# Patient Record
Sex: FEMALE | Race: BLACK OR AFRICAN AMERICAN | Employment: PART TIME | URBAN - METROPOLITAN AREA
[De-identification: names, ages, dates, MRNs, and addresses within clinical notes are randomized per-mention and may not be internally consistent; named-entity substitution may affect disease eponyms.]

---
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
## 2018-02-17 ENCOUNTER — HOSPITAL ENCOUNTER (EMERGENCY)
Age: 18
Discharge: HOME | End: 2018-02-17
Attending: EMERGENCY MEDICINE
Payer: SELF-PAY

## 2018-02-17 ENCOUNTER — APPOINTMENT (OUTPATIENT)
Dept: GENERAL RADIOLOGY | Age: 18
End: 2018-02-17
Attending: PHYSICIAN ASSISTANT
Payer: SELF-PAY

## 2018-02-17 VITALS
OXYGEN SATURATION: 99 % | DIASTOLIC BLOOD PRESSURE: 72 MMHG | TEMPERATURE: 98.4 F | HEART RATE: 103 BPM | RESPIRATION RATE: 16 BRPM | SYSTOLIC BLOOD PRESSURE: 125 MMHG

## 2018-02-17 DIAGNOSIS — M79.672 PAIN IN BOTH FEET: Primary | ICD-10-CM

## 2018-02-17 DIAGNOSIS — M79.671 PAIN IN BOTH FEET: Primary | ICD-10-CM

## 2018-02-17 DIAGNOSIS — S16.1XXA NECK STRAIN, INITIAL ENCOUNTER: ICD-10-CM

## 2018-02-17 LAB — HCG UR QL: NEGATIVE

## 2018-02-17 PROCEDURE — 81025 URINE PREGNANCY TEST: CPT | Performed by: EMERGENCY MEDICINE

## 2018-02-17 PROCEDURE — 74011250636 HC RX REV CODE- 250/636: Performed by: PHYSICIAN ASSISTANT

## 2018-02-17 PROCEDURE — 96372 THER/PROPH/DIAG INJ SC/IM: CPT

## 2018-02-17 PROCEDURE — 73620 X-RAY EXAM OF FOOT: CPT

## 2018-02-17 PROCEDURE — 99283 EMERGENCY DEPT VISIT LOW MDM: CPT

## 2018-02-17 RX ORDER — METHOCARBAMOL 500 MG/1
500 TABLET, FILM COATED ORAL 4 TIMES DAILY
Qty: 20 TAB | Refills: 0 | Status: SHIPPED | OUTPATIENT
Start: 2018-02-17 | End: 2018-02-22

## 2018-02-17 RX ORDER — NAPROXEN 500 MG/1
500 TABLET ORAL 2 TIMES DAILY WITH MEALS
Qty: 20 TAB | Refills: 0 | Status: SHIPPED | OUTPATIENT
Start: 2018-02-17 | End: 2018-02-27

## 2018-02-17 RX ORDER — KETOROLAC TROMETHAMINE 30 MG/ML
60 INJECTION, SOLUTION INTRAMUSCULAR; INTRAVENOUS
Status: COMPLETED | OUTPATIENT
Start: 2018-02-17 | End: 2018-02-17

## 2018-02-17 RX ADMIN — KETOROLAC TROMETHAMINE 60 MG: 30 INJECTION, SOLUTION INTRAMUSCULAR at 14:45

## 2018-02-17 NOTE — DISCHARGE INSTRUCTIONS
Foot Pain: Care Instructions  Your Care Instructions  Foot injuries that cause pain and swelling are fairly common. Almost all sports or home repair projects can cause a misstep that ends up as foot pain. Normal wear and tear, especially as you get older, also can cause foot pain. Most minor foot injuries will heal on their own, and home treatment is usually all you need to do. If you have a severe injury, you may need tests and treatment. Follow-up care is a key part of your treatment and safety. Be sure to make and go to all appointments, and call your doctor if you are having problems. It's also a good idea to know your test results and keep a list of the medicines you take. How can you care for yourself at home? · Take pain medicines exactly as directed. ¨ If the doctor gave you a prescription medicine for pain, take it as prescribed. ¨ If you are not taking a prescription pain medicine, ask your doctor if you can take an over-the-counter medicine. · Rest and protect your foot. Take a break from any activity that may cause pain. · Put ice or a cold pack on your foot for 10 to 20 minutes at a time. Put a thin cloth between the ice and your skin. · Prop up the sore foot on a pillow when you ice it or anytime you sit or lie down during the next 3 days. Try to keep it above the level of your heart. This will help reduce swelling. · Your doctor may recommend that you wrap your foot with an elastic bandage. Keep your foot wrapped for as long as your doctor advises. · If your doctor recommends crutches, use them as directed. · Wear roomy footwear. · As soon as pain and swelling end, begin gentle exercises of your foot. Your doctor can tell you which exercises will help. When should you call for help? Call 911 anytime you think you may need emergency care. For example, call if:  ? · Your foot turns pale, white, blue, or cold.    ?Call your doctor now or seek immediate medical care if:  ? · You cannot move or stand on your foot. ? · Your foot looks twisted or out of its normal position. ? · Your foot is not stable when you step down. ? · You have signs of infection, such as:  ¨ Increased pain, swelling, warmth, or redness. ¨ Red streaks leading from the sore area. ¨ Pus draining from a place on your foot. ¨ A fever. ? · Your foot is numb or tingly. ? Watch closely for changes in your health, and be sure to contact your doctor if:  ? · You do not get better as expected. ? · You have bruises from an injury that last longer than 2 weeks. Where can you learn more? Go to http://georgina-tavo.info/. Enter H991 in the search box to learn more about \"Foot Pain: Care Instructions. \"  Current as of: March 21, 2017  Content Version: 11.4  © 9114-3739 [employer]. Care instructions adapted under license by [employer] (which disclaims liability or warranty for this information). If you have questions about a medical condition or this instruction, always ask your healthcare professional. [address] any warranty or liability for your use of this information. Neck Strain: Care Instructions  Your Care Instructions    You have strained the muscles and ligaments in your neck. A sudden, awkward movement can strain the neck. This often occurs with falls or car accidents or during certain sports. Everyday activities like working on a computer or sleeping can also cause neck strain if they force you to hold your neck in an awkward position for a long time. It is common for neck pain to get worse for a day or two after an injury, but it should start to feel better after that. You may have more pain and stiffness for several days before it gets better. This is expected. It may take a few weeks or longer for it to heal completely. Good home treatment can help you get better faster and avoid future neck problems.   Follow-up care is a key part of your treatment and safety. Be sure to make and go to all appointments, and call your doctor if you are having problems. It's also a good idea to know your test results and keep a list of the medicines you take. How can you care for yourself at home? · If you were given a neck brace (cervical collar) to limit neck motion, wear it as instructed for as many days as your doctor tells you to. Do not wear it longer than you were told to. Wearing a brace for too long can make neck stiffness worse and weaken the neck muscles. · You can try using heat or ice to see if it helps. ¨ Try using a heating pad on a low or medium setting for 15 to 20 minutes every 2 to 3 hours. Try a warm shower in place of one session with the heating pad. You can also buy single-use heat wraps that last up to 8 hours. ¨ You can also try an ice pack for 10 to 15 minutes every 2 to 3 hours. · Take pain medicines exactly as directed. ¨ If the doctor gave you a prescription medicine for pain, take it as prescribed. ¨ If you are not taking a prescription pain medicine, ask your doctor if you can take an over-the-counter medicine. · Gently rub the area to relieve pain and help with blood flow. Do not massage the area if it hurts to do so. · Do not do anything that makes the pain worse. Take it easy for a couple of days. You can do your usual activities if they do not hurt your neck or put it at risk for more stress or injury. · Try sleeping on a special neck pillow. Place it under your neck, not under your head. Placing a tightly rolled-up towel under your neck while you sleep will also work. If you use a neck pillow or rolled towel, do not use your regular pillow at the same time. · To prevent future neck pain, do exercises to stretch and strengthen your neck and back. Learn how to use good posture, safe lifting techniques, and proper body mechanics. When should you call for help? Call 911 anytime you think you may need emergency care.  For example, call if:  ? · You are unable to move an arm or a leg at all. ?Call your doctor now or seek immediate medical care if:  ? · You have new or worse symptoms in your arms, legs, chest, belly, or buttocks. Symptoms may include:  ¨ Numbness or tingling. ¨ Weakness. ¨ Pain. ? · You lose bladder or bowel control. ? Watch closely for changes in your health, and be sure to contact your doctor if:  ? · You are not getting better as expected. Where can you learn more? Go to http://georgina-tavo.info/. Enter M253 in the search box to learn more about \"Neck Strain: Care Instructions. \"  Current as of: March 21, 2017  Content Version: 11.4  © 8113-0706 [employer]. Care instructions adapted under license by [employer] (which disclaims liability or warranty for this information). If you have questions about a medical condition or this instruction, always ask your healthcare professional. [place] [address] any warranty or liability for your use of this information.

## 2018-02-17 NOTE — ED PROVIDER NOTES
EMERGENCY DEPARTMENT HISTORY AND PHYSICAL EXAM    1:52 PM      Date: 2/17/2018  Patient Name: Bethanie Blank    History of Presenting Illness     Chief Complaint   Patient presents with    Neck Pain    Foot Pain         History Provided By: Patient    Chief Complaint: neck pain, foot pain  Duration: 2 Months  Timing:  Intermittent  Location: left side of neck, both feet  Quality: Cramping, Sharp and Tightness  Severity: 10 out of 10  Modifying Factors: neck pain worse in the morning, foot pain worse after long standing  Associated Symptoms: denies any other associated signs or symptoms      Additional History (Context): Bethanie Blank is a 21 y.o. female with No significant past medical history who presents with 2 months of intermittent left sided neck pain (cramping/tightness) that is worse in the morning and 10/10 sharp bilateral foot pain that is worse after long standing. Pt works in a job that requires her to stand for 8 hours. No other concerns or symptoms at this time. PCP: Kasia Copeland MD        Past History     Past Medical History:  No past medical history on file. Past Surgical History:  No past surgical history on file. Family History:  No family history on file. Social History:  Social History   Substance Use Topics    Smoking status: Not on file    Smokeless tobacco: Not on file    Alcohol use Not on file       Allergies:  No Known Allergies      Review of Systems       Review of Systems   Gastrointestinal: Negative for abdominal pain. Musculoskeletal: Positive for neck pain (left side). Positive for bilateral foot pain   All other systems reviewed and are negative. Physical Exam     Visit Vitals    /72 (BP 1 Location: Left arm, BP Patient Position: At rest)    Pulse (!) 103    Temp 98.4 °F (36.9 °C)    Resp 16    SpO2 99%         Physical Exam   Constitutional: She is oriented to person, place, and time.  She appears well-developed and well-nourished. No distress. HENT:   Head: Normocephalic and atraumatic. Eyes: EOM are normal. Pupils are equal, round, and reactive to light. Neck: Neck supple. No tracheal deviation present. Cardiovascular: Normal rate, regular rhythm and normal heart sounds. Exam reveals no gallop and no friction rub. No murmur heard. Pulmonary/Chest: Effort normal and breath sounds normal. No respiratory distress. She has no wheezes. She has no rales. Musculoskeletal:   Non tender to midline palpation of the cervical, thoracic, and lumbar spine. Mild TTP over the superior border of the left trapezius with noted muscle spasm. No step off or deformity. FROM of BUE and BLE against resistance in flexion and extension with 5/5 strength. Non tender to bilateral shoulders/elbows/hands/hips/knees/ankles. Pulses intact and equal.   Mild TTP over the base of the feet, noted flat footed feet. No edema or erythema. Ambulates with ease. Sensation is grossly intact. Lymphadenopathy:     She has no cervical adenopathy. Neurological: She is alert and oriented to person, place, and time. No cranial nerve deficit. Skin: Skin is warm and dry. No rash noted. She is not diaphoretic. No erythema. No pallor. Psychiatric: She has a normal mood and affect. Her behavior is normal.   Nursing note and vitals reviewed. Diagnostic Study Results     Labs -  No results found for this or any previous visit (from the past 12 hour(s)). Radiologic Studies -   XR FOOT LT AP/LAT    (Results Pending)   XR FOOT RT AP/LAT    (Results Pending)   XR foot bilateral prelim by me: No acute abnormality      Medical Decision Making   I am the first provider for this patient. I reviewed the vital signs, available nursing notes, past medical history, past surgical history, family history and social history. Vital Signs-Reviewed the patient's vital signs.     Records Reviewed: Nursing Notes and Old Medical Records (Time of Review: 1:52 PM)    ED Course: Progress Notes, Reevaluation, and Consults:  DDX:  Foot strain, neck strain, plantar fasciitis    Impression: Patient with bilateral foot pain, likely from overuse at her job, will give compression hose, plus encouraged patient to get foot inserts. As for neck, suspect musculoskeletal origin, do not think she needs an XR today on her neck. Will send hoem with muscle relaxant and Nsaids. Patient agrees. [place], [place]          Diagnosis     Clinical Impression:   1. Pain in both feet    2. Neck strain, initial encounter        Disposition: discharged    Follow-up Information     Follow up With Details Comments Contact Info    Binu Stevens DPM Go in 2 days For follow up [address] [place] [facility] EMERGENCY DEPT Go to As needed, If symptoms worsen [address] [facility] [address]  [phone]    Amaris Ybarra MD Go in 2 days For follow up [address]  [phone]             Patient's Medications   Start Taking    COMP. STOCKING,KNEE,REGULAR,LRG MISC    Dispense one pair of compression stockings    METHOCARBAMOL (ROBAXIN) 500 MG TABLET    Take 1 Tab by mouth four (4) times daily for 5 days. NAPROXEN (NAPROSYN) 500 MG TABLET    Take 1 Tab by mouth two (2) times daily (with meals) for 10 days. Continue Taking    No medications on file   These Medications have changed    No medications on file   Stop Taking    No medications on file     _______________________________    Attestations:  [facility] acting as a scribe for and in the presence of Laverne Noonan PA-C      February 17, 2018 at 1:52 PM       Provider Attestation:      I personally performed the services described in the documentation, reviewed the documentation, as recorded by the scribe in my presence, and it accurately and completely records my words and actions.  February 17, 2018 at 1:52 PM Carolyn Saliva, PA-C   _______________________________

## 2018-02-17 NOTE — ED TRIAGE NOTES
Patient complains of left side neck pian that has been hurting for a while. Patient also complains of bilat foot pain for months. States it feels like pins and needles.

## 2018-02-17 NOTE — LETTER
[facility] EMERGENCY DEPT 
[address] [facility] [address] 
[phone] Work/School Note Date: 2/17/2018 To Whom It May concern: 
 
Joe Smith was seen and treated today in the emergency room by the following provider(s): 
Attending Provider: Angi Khan DO Physician Assistant: SAAD Sauceda. Joe Smith may return to work on 19 Feb 2018. Sincerely, Beatrice Lehman PA-C

## 2018-05-12 ENCOUNTER — HOSPITAL ENCOUNTER (EMERGENCY)
Age: 18
Discharge: HOME | End: 2018-05-12
Attending: EMERGENCY MEDICINE
Payer: MEDICAID

## 2018-05-12 VITALS
RESPIRATION RATE: 16 BRPM | TEMPERATURE: 98.4 F | OXYGEN SATURATION: 100 % | HEART RATE: 81 BPM | DIASTOLIC BLOOD PRESSURE: 71 MMHG | WEIGHT: 252 LBS | HEIGHT: 64 IN | SYSTOLIC BLOOD PRESSURE: 106 MMHG | BODY MASS INDEX: 43.02 KG/M2

## 2018-05-12 DIAGNOSIS — R07.89 ATYPICAL CHEST PAIN: Primary | ICD-10-CM

## 2018-05-12 DIAGNOSIS — Z3A.08 8 WEEKS GESTATION OF PREGNANCY: ICD-10-CM

## 2018-05-12 LAB
ALBUMIN SERPL-MCNC: 3 G/DL (ref 3.4–5)
ALBUMIN/GLOB SERPL: 0.7 {RATIO} (ref 0.8–1.7)
ALP SERPL-CCNC: 67 U/L (ref 45–117)
ALT SERPL-CCNC: 20 U/L (ref 13–56)
ANION GAP SERPL CALC-SCNC: 6 MMOL/L (ref 3–18)
AST SERPL-CCNC: 16 U/L (ref 15–37)
ATRIAL RATE: 87 BPM
BASOPHILS # BLD: 0 K/UL (ref 0–0.06)
BASOPHILS NFR BLD: 0 % (ref 0–2)
BILIRUB SERPL-MCNC: 0.2 MG/DL (ref 0.2–1)
BUN SERPL-MCNC: 6 MG/DL (ref 7–18)
BUN/CREAT SERPL: 8 (ref 12–20)
CALCIUM SERPL-MCNC: 8.8 MG/DL (ref 8.5–10.1)
CALCULATED P AXIS, ECG09: 5 DEGREES
CALCULATED R AXIS, ECG10: 37 DEGREES
CALCULATED T AXIS, ECG11: 29 DEGREES
CHLORIDE SERPL-SCNC: 104 MMOL/L (ref 100–108)
CK MB CFR SERPL CALC: 0.7 % (ref 0–4)
CK MB SERPL-MCNC: 1.6 NG/ML (ref 5–25)
CK SERPL-CCNC: 217 U/L (ref 26–192)
CO2 SERPL-SCNC: 26 MMOL/L (ref 21–32)
CREAT SERPL-MCNC: 0.71 MG/DL (ref 0.6–1.3)
DIAGNOSIS, 93000: NORMAL
DIFFERENTIAL METHOD BLD: ABNORMAL
EOSINOPHIL # BLD: 0.1 K/UL (ref 0–0.4)
EOSINOPHIL NFR BLD: 1 % (ref 0–5)
ERYTHROCYTE [DISTWIDTH] IN BLOOD BY AUTOMATED COUNT: 17.4 % (ref 11.6–14.5)
GLOBULIN SER CALC-MCNC: 4.4 G/DL (ref 2–4)
GLUCOSE SERPL-MCNC: 90 MG/DL (ref 74–99)
HCG SERPL-ACNC: ABNORMAL MIU/ML (ref 1–6)
HCT VFR BLD AUTO: 32.9 % (ref 35–45)
HGB BLD-MCNC: 10.8 G/DL (ref 12–16)
LYMPHOCYTES # BLD: 2.6 K/UL (ref 0.9–3.6)
LYMPHOCYTES NFR BLD: 26 % (ref 21–52)
MAGNESIUM SERPL-MCNC: 1.9 MG/DL (ref 1.6–2.6)
MCH RBC QN AUTO: 18.8 PG (ref 24–34)
MCHC RBC AUTO-ENTMCNC: 32.8 G/DL (ref 31–37)
MCV RBC AUTO: 57.3 FL (ref 74–97)
MONOCYTES # BLD: 0.6 K/UL (ref 0.05–1.2)
MONOCYTES NFR BLD: 6 % (ref 3–10)
NEUTS SEG # BLD: 6.6 K/UL (ref 1.8–8)
NEUTS SEG NFR BLD: 67 % (ref 40–73)
P-R INTERVAL, ECG05: 128 MS
PLATELET # BLD AUTO: 343 K/UL (ref 135–420)
PMV BLD AUTO: 10 FL (ref 9.2–11.8)
POTASSIUM SERPL-SCNC: 3.7 MMOL/L (ref 3.5–5.5)
PROT SERPL-MCNC: 7.4 G/DL (ref 6.4–8.2)
Q-T INTERVAL, ECG07: 378 MS
QRS DURATION, ECG06: 76 MS
QTC CALCULATION (BEZET), ECG08: 454 MS
RBC # BLD AUTO: 5.74 M/UL (ref 4.2–5.3)
SODIUM SERPL-SCNC: 136 MMOL/L (ref 136–145)
TROPONIN I SERPL-MCNC: <0.02 NG/ML (ref 0–0.06)
VENTRICULAR RATE, ECG03: 87 BPM
WBC # BLD AUTO: 9.9 K/UL (ref 4.6–13.2)

## 2018-05-12 PROCEDURE — 84702 CHORIONIC GONADOTROPIN TEST: CPT | Performed by: EMERGENCY MEDICINE

## 2018-05-12 PROCEDURE — 80053 COMPREHEN METABOLIC PANEL: CPT | Performed by: EMERGENCY MEDICINE

## 2018-05-12 PROCEDURE — 82550 ASSAY OF CK (CPK): CPT | Performed by: EMERGENCY MEDICINE

## 2018-05-12 PROCEDURE — 99285 EMERGENCY DEPT VISIT HI MDM: CPT

## 2018-05-12 PROCEDURE — 83735 ASSAY OF MAGNESIUM: CPT | Performed by: EMERGENCY MEDICINE

## 2018-05-12 PROCEDURE — 93005 ELECTROCARDIOGRAM TRACING: CPT

## 2018-05-12 PROCEDURE — 85025 COMPLETE CBC W/AUTO DIFF WBC: CPT | Performed by: EMERGENCY MEDICINE

## 2018-05-12 NOTE — DISCHARGE INSTRUCTIONS
Chest Pain: Care Instructions  Your Care Instructions    There are many things that can cause chest pain. Some are not serious and will get better on their own in a few days. But some kinds of chest pain need more testing and treatment. Your doctor may have recommended a follow-up visit in the next 8 to 12 hours. If you are not getting better, you may need more tests or treatment. Even though your doctor has released you, you still need to watch for any problems. The doctor carefully checked you, but sometimes problems can develop later. If you have new symptoms or if your symptoms do not get better, get medical care right away. If you have worse or different chest pain or pressure that lasts more than 5 minutes or you passed out (lost consciousness), call 911 or seek other emergency help right away. A medical visit is only one step in your treatment. Even if you feel better, you still need to do what your doctor recommends, such as going to all suggested follow-up appointments and taking medicines exactly as directed. This will help you recover and help prevent future problems. How can you care for yourself at home? · Rest until you feel better. · Take your medicine exactly as prescribed. Call your doctor if you think you are having a problem with your medicine. · Do not drive after taking a prescription pain medicine. When should you call for help? Call 911 if:  ? · You passed out (lost consciousness). ? · You have severe difficulty breathing. ? · You have symptoms of a heart attack. These may include:  ¨ Chest pain or pressure, or a strange feeling in your chest.  ¨ Sweating. ¨ Shortness of breath. ¨ Nausea or vomiting. ¨ Pain, pressure, or a strange feeling in your back, neck, jaw, or upper belly or in one or both shoulders or arms. ¨ Lightheadedness or sudden weakness. ¨ A fast or irregular heartbeat.   After you call 911, the  may tell you to chew 1 adult-strength or 2 to 4 low-dose aspirin. Wait for an ambulance. Do not try to drive yourself. ?Call your doctor today if:  ? · You have any trouble breathing. ? · Your chest pain gets worse. ? · You are dizzy or lightheaded, or you feel like you may faint. ? · You are not getting better as expected. ? · You are having new or different chest pain. Where can you learn more? Go to http://georgina-tavo.info/. Enter A120 in the search box to learn more about \"Chest Pain: Care Instructions. \"  Current as of: March 20, 2017  Content Version: 11.4  © 8311-5679 [employer]. Care instructions adapted under license by [employer] (which disclaims liability or warranty for this information). If you have questions about a medical condition or this instruction, always ask your healthcare professional. [address] any warranty or liability for your use of this information.

## 2018-05-12 NOTE — LETTER
[facility] EMERGENCY DEPT 
[address] [facility] [address] [phone] Work/School Note Date: 5/12/2018 To Whom It May concern: 
 
Chon Wilkerson was seen and treated today in the emergency room by the following provider(s): 
Attending Provider: Paulina Kim MD. Chon Wilkerson may return to work on 5/13/2018.  
 
Sincerely, 
 
 
 
 
Quinn Braden RN

## 2018-05-12 NOTE — ED PROVIDER NOTES
EMERGENCY DEPARTMENT HISTORY AND PHYSICAL EXAM    11:15 AM      Date: 5/12/2018  Patient Name: Jeromy Schmid    History of Presenting Illness     Chief Complaint   Patient presents with    Chest Pain         History Provided By: Patient    Chief Complaint: Chest Pain  Duration: 2 Days  Timing:  Intermittent  Location: Central  Quality:   Severity: 5 out of 10  Modifying Factors: while laying down  Associated Symptoms: Denies cough      Additional History (Context): Jeromy Schmid is a 19 y.o. female with No significant past medical history who presents with c/o intermittent episodes of 5/10 central chest pain onset while laying down 2 days ago. Pt reports pain resolved on own but then returned 1 hour ago and is only present when she tries to move to tries to get up. Denies having currently in ED. Denies cough. Denies burning sensation in chest.  Denies hx of cardiac disease. Pt is a non-smoker and denies ETOH/drug use. Family at bedside. PCP: Joseluis James MD    Current Outpatient Prescriptions   Medication Sig Dispense Refill    prenatal vit-iron fumarate-fa 27 mg iron- 0.8 mg tab tablet Take 1 Tab by mouth daily. Past History     Past Medical History:  History reviewed. No pertinent past medical history. Past Surgical History:  History reviewed. No pertinent surgical history. Family History:  History reviewed. No pertinent family history. Social History:  Social History   Substance Use Topics    Smoking status: Never Smoker    Smokeless tobacco: Never Used    Alcohol use No       Allergies:  No Known Allergies      Review of Systems       Review of Systems   Constitutional: Negative. HENT: Negative. Eyes: Negative. Respiratory: Negative. Negative for cough. Cardiovascular: Positive for chest pain. Gastrointestinal: Negative. Endocrine: Negative. Genitourinary: Negative. Musculoskeletal: Negative. Skin: Negative. Allergic/Immunologic: Negative. Neurological: Negative. Hematological: Negative. Psychiatric/Behavioral: Negative. All other systems reviewed and are negative. Physical Exam     Visit Vitals    /67    Pulse 89    Temp 98.4 °F (36.9 °C)    Resp 21    Ht 5' 4\" (1.626 m)    Wt 114.3 kg (252 lb)    LMP 03/29/2018    SpO2 100%    BMI 43.26 kg/m2         Physical Exam   Constitutional: She is oriented to person, place, and time. She appears well-developed and well-nourished. No distress. HENT:   Head: Normocephalic. Right Ear: External ear normal.   Left Ear: External ear normal.   Mouth/Throat: No oropharyngeal exudate. Eyes: Conjunctivae and EOM are normal. Pupils are equal, round, and reactive to light. Right eye exhibits no discharge. Left eye exhibits no discharge. No scleral icterus. Neck: Normal range of motion. Neck supple. No JVD present. No tracheal deviation present. No thyromegaly present. Cardiovascular: Normal rate, regular rhythm, normal heart sounds and intact distal pulses. Exam reveals no gallop and no friction rub. No murmur heard. Pulmonary/Chest: Effort normal and breath sounds normal. No stridor. No respiratory distress. She has no wheezes. She has no rales. She exhibits no tenderness. Abdominal: Soft. Bowel sounds are normal. She exhibits no distension and no mass. There is no tenderness. There is no rebound and no guarding. Musculoskeletal: Normal range of motion. She exhibits no edema or tenderness. Lymphadenopathy:     She has no cervical adenopathy. Neurological: She is alert and oriented to person, place, and time. She displays normal reflexes. No cranial nerve deficit. She exhibits normal muscle tone. Coordination normal.   Skin: Skin is warm and dry. No rash noted. She is not diaphoretic. No erythema. No pallor. Nursing note and vitals reviewed.         Diagnostic Study Results     Labs -  Recent Results (from the past 12 hour(s))   CBC WITH AUTOMATED DIFF Collection Time: 05/12/18 11:10 AM   Result Value Ref Range    WBC 9.9 4.6 - 13.2 K/uL    RBC 5.74 (H) 4.20 - 5.30 M/uL    HGB 10.8 (L) 12.0 - 16.0 g/dL    HCT 32.9 (L) 35.0 - 45.0 %    MCV 57.3 (L) 74.0 - 97.0 FL    MCH 18.8 (L) 24.0 - 34.0 PG    MCHC 32.8 31.0 - 37.0 g/dL    RDW 17.4 (H) 11.6 - 14.5 %    PLATELET 726 984 - 430 K/uL    MPV 10.0 9.2 - 11.8 FL    NEUTROPHILS 67 40 - 73 %    LYMPHOCYTES 26 21 - 52 %    MONOCYTES 6 3 - 10 %    EOSINOPHILS 1 0 - 5 %    BASOPHILS 0 0 - 2 %    ABS. NEUTROPHILS 6.6 1.8 - 8.0 K/UL    ABS. LYMPHOCYTES 2.6 0.9 - 3.6 K/UL    ABS. MONOCYTES 0.6 0.05 - 1.2 K/UL    ABS. EOSINOPHILS 0.1 0.0 - 0.4 K/UL    ABS. BASOPHILS 0.0 0.0 - 0.06 K/UL    DF AUTOMATED     METABOLIC PANEL, COMPREHENSIVE    Collection Time: 05/12/18 11:10 AM   Result Value Ref Range    Sodium 136 136 - 145 mmol/L    Potassium 3.7 3.5 - 5.5 mmol/L    Chloride 104 100 - 108 mmol/L    CO2 26 21 - 32 mmol/L    Anion gap 6 3.0 - 18 mmol/L    Glucose 90 74 - 99 mg/dL    BUN 6 (L) 7.0 - 18 MG/DL    Creatinine 0.71 0.6 - 1.3 MG/DL    BUN/Creatinine ratio 8 (L) 12 - 20      GFR est AA >60 >60 ml/min/1.73m2    GFR est non-AA >60 >60 ml/min/1.73m2    Calcium 8.8 8.5 - 10.1 MG/DL    Bilirubin, total 0.2 0.2 - 1.0 MG/DL    ALT (SGPT) 20 13 - 56 U/L    AST (SGOT) 16 15 - 37 U/L    Alk.  phosphatase 67 45 - 117 U/L    Protein, total 7.4 6.4 - 8.2 g/dL    Albumin 3.0 (L) 3.4 - 5.0 g/dL    Globulin 4.4 (H) 2.0 - 4.0 g/dL    A-G Ratio 0.7 (L) 0.8 - 1.7     MAGNESIUM    Collection Time: 05/12/18 11:10 AM   Result Value Ref Range    Magnesium 1.9 1.6 - 2.6 mg/dL   CARDIAC PANEL,(CK, CKMB & TROPONIN)    Collection Time: 05/12/18 11:10 AM   Result Value Ref Range     (H) 26 - 192 U/L    CK - MB 1.6 <3.6 ng/ml    CK-MB Index 0.7 0.0 - 4.0 %    Troponin-I, Qt. <0.02 0.00 - 0.06 NG/ML   BETA HCG, QT    Collection Time: 05/12/18 11:10 AM   Result Value [place] [address] (H) 1.0 - 6.0 MIU/ML   EKG, 12 LEAD, INITIAL Collection Time: 05/12/18 11:12 AM   Result Value Ref Range    Ventricular Rate 87 BPM    Atrial Rate 87 BPM    P-R Interval 128 ms    QRS Duration 76 ms    Q-T Interval 378 ms    QTC Calculation (Bezet) 454 ms    Calculated P Axis 5 degrees    Calculated R Axis 37 degrees    Calculated T Axis 29 degrees    Diagnosis       Normal sinus rhythm  Normal ECG  No previous ECGs available         Radiologic Studies -   No orders to display         Medical Decision Making   I am the first provider for this patient. I reviewed the vital signs, available nursing notes, past medical history, past surgical history, family history and social history. Vital Signs-Reviewed the patient's vital signs. Pulse Oximetry Analysis -  100% on room air (Interpretation) Normal    Cardiac Monitor:  Rate: 87 bpm  Rhythm:  Normal Sinus Rhythm     EKG: Interpreted by the EP. Time Interpreted: 11:13 AM   Rate: 87 bpm   Rhythm: Normal Sinus Rhythm    Interpretation: No STEMI      Records Reviewed: Nursing Notes (Time of Review: 11:19 AM)      Provider Notes (Medical Decision Making):   DDX: atypical chest pain, pneumothorax, muscular skeletal pain, pneumonia, anxiety, pleuritis, GERD    Diagnosis     Clinical Impression:   1. Atypical chest pain    2. 8 weeks gestation of pregnancy        Disposition: Discharge     Follow-up Information     Follow up With Details Comments Herrera Willams MD Call For follow up in the next 5 days [address] [place]  [address] Brigida Colin      [address] [facility] EMERGENCY DEPT Go to As needed, If symptoms worsen [address] [facility]  [phone]           Patient's Medications   Start Taking    No medications on file   Continue Taking    PRENATAL VIT-IRON FUMARATE-FA 27 MG IRON- 0.8 MG TAB TABLET    Take 1 Tab by mouth daily. These Medications have changed    No medications on file   Stop Taking    [address] Dispense one pair of compression stockings     _______________________________    Attestations:  Aniceto Paredes. acting as a scribe for and in the presence of Lois Lino MD      May 12, 2018 at 11:19 AM       Provider Attestation:      I personally performed the services described in the documentation, reviewed the documentation, as recorded by the scribe in my presence, and it accurately and completely records my words and actions.  May 12, 2018 at 11:19 AM - Lois Lino MD    _______________________________

## 2018-05-12 NOTE — ED NOTES
Lakesha Lehman is a 19 y.o. female that was discharged in stable condition. The patients diagnosis, condition and treatment were explained to  patient and aftercare instructions were given. The patient verbalized understanding. Patient armband removed and shredded.

## 2018-05-14 ENCOUNTER — HOSPITAL ENCOUNTER (EMERGENCY)
Age: 18
Discharge: HOME | End: 2018-05-14
Attending: EMERGENCY MEDICINE
Payer: MEDICAID

## 2018-05-14 VITALS
RESPIRATION RATE: 18 BRPM | HEIGHT: 64 IN | SYSTOLIC BLOOD PRESSURE: 130 MMHG | OXYGEN SATURATION: 98 % | HEART RATE: 99 BPM | BODY MASS INDEX: 43.02 KG/M2 | DIASTOLIC BLOOD PRESSURE: 90 MMHG | TEMPERATURE: 98.2 F | WEIGHT: 252 LBS

## 2018-05-14 DIAGNOSIS — R04.0 EPISTAXIS: Primary | ICD-10-CM

## 2018-05-14 PROCEDURE — 99281 EMR DPT VST MAYX REQ PHY/QHP: CPT

## 2018-05-14 RX ORDER — OXYMETAZOLINE HCL 0.05 %
2 SPRAY, NON-AEROSOL (ML) NASAL 2 TIMES DAILY
Qty: 1 EACH | Refills: 0 | Status: SHIPPED | OUTPATIENT
Start: 2018-05-14 | End: 2018-05-17

## 2018-05-14 NOTE — ED PROVIDER NOTES
EMERGENCY DEPARTMENT HISTORY AND PHYSICAL EXAM    11:04 AM      Date: 5/14/2018  Patient Name: Juan Carlos Simon    History of Presenting Illness     Chief Complaint   Patient presents with    Epistaxis         History Provided By: Patient    Chief Complaint: Epistaxis  Duration:  3 days  Timing:  intermittent  Location: N/a  Quality: oozing  Severity: 3/10  Modifying Factors: None  Associated Symptoms: nasal congestion      Additional History (Context): Juan Carlos Simon is a 19 y.o. female with No significant past medical history who presents with intermittent 3/10 nose bleeds for the past 3 days. Pt reports that her most recent epistaxis episode was this morning at 9:00 AM. The blood was \"oozing and she felt some of it go down her throat. \" She has been experiencing nasal congestion from a cold that she has been fighting. The pt is pregnant so she has not tried any over the counter medications to treat her sx. No other concerns, modifying factors, or symptoms were expressed by the pt at this time. PCP: Nishi Loza MD    Current Outpatient Prescriptions   Medication Sig Dispense Refill    oxymetazoline (AFRIN, OXYMETAZOLINE,) 0.05 % nasal spray 2 Sprays by Both Nostrils route two (2) times a day for 3 days. 1 Each 0    sod bicarb-sod chlor-neti pot pkdv 1 Box by Nasal route daily. 1 Each 0    prenatal vit-iron fumarate-fa 27 mg iron- 0.8 mg tab tablet Take 1 Tab by mouth daily. Past History     Past Medical History:  History reviewed. No pertinent past medical history. Past Surgical History:  History reviewed. No pertinent surgical history. Family History:  History reviewed. No pertinent family history. Social History:  Social History   Substance Use Topics    Smoking status: Never Smoker    Smokeless tobacco: Never Used    Alcohol use No       Allergies:  No Known Allergies      Review of Systems       Review of Systems   Constitutional: Negative for fever.    HENT: Positive for congestion (sinus) and nosebleeds. Negative for facial swelling. Eyes: Negative for visual disturbance. Respiratory: Negative for shortness of breath. Cardiovascular: Negative for chest pain. Gastrointestinal: Negative for abdominal pain. Genitourinary: Negative for dysuria. Musculoskeletal: Negative for neck pain. Skin: Negative for rash. Neurological: Negative for dizziness. Psychiatric/Behavioral: Negative for confusion. All other systems reviewed and are negative. Physical Exam     Visit Vitals    /90 (BP 1 Location: Left arm, BP Patient Position: At rest)    Pulse 99    Temp 98.2 °F (36.8 °C)    Resp 18    Ht 5' 4\" (1.626 m)    Wt 114.3 kg (252 lb)    SpO2 98%    BMI 43.26 kg/m2         Physical Exam   Constitutional: She is oriented to person, place, and time. She appears well-developed and well-nourished. No distress. HENT:   Head: Normocephalic and atraumatic. Nose: Mucosal edema present. No active bleeding    Eyes: Conjunctivae are normal.   Neck: Normal range of motion. Cardiovascular: Normal rate and regular rhythm. Pulmonary/Chest: Effort normal.   Abdominal: She exhibits no distension. Musculoskeletal: Normal range of motion. Neurological: She is alert and oriented to person, place, and time. Skin: Skin is warm and dry. She is not diaphoretic. Psychiatric: She has a normal mood and affect. Nursing note and vitals reviewed. Diagnostic Study Results     Labs -  No results found for this or any previous visit (from the past 12 hour(s)). Radiologic Studies -   No orders to display         Medical Decision Making   I am the first provider for this patient. I reviewed the vital signs, available nursing notes, past medical history, past surgical history, family history and social history. Vital Signs-Reviewed the patient's vital signs.       Records Reviewed: Nursing Notes (Time of Review: 11:04 AM)    ED Course: Progress Notes, Reevaluation, and Consults:      Provider Notes (Medical Decision Making): MDM  Number of Diagnoses or Management Options  Epistaxis:   Diagnosis management comments: No active bleeding. Vitals stable. No dizziness. Discussed prevention and treatment strategies. Does not need blood work/           Diagnosis     Clinical Impression:   1. Epistaxis        Disposition:     Follow-up Information     Follow up With Details Comments Chelo Gallagher MD Schedule an appointment as soon as possible for a visit If symptoms do not improve [address]  [address]  [address]  [phone]      [facility] EMERGENCY DEPT  Immediately if symptoms worsen [address]  [phone]           Discharge Medication List as of 5/14/2018 11:30 AM      START taking these medications    Details   oxymetazoline (AFRIN, OXYMETAZOLINE,) 0.05 % nasal spray 2 Sprays by Both Nostrils route two (2) times a day for 3 days. , Print, Disp-1 Each, R-0      sod bicarb-sod chlor-neti pot pkdv 1 Box by Nasal route daily. , Print, Disp-1 Each, R-0         CONTINUE these medications which have NOT CHANGED    Details   prenatal vit-iron fumarate-fa 27 mg iron- 0.8 mg tab tablet Take 1 Tab by mouth daily. , Historical Med           _______________________________    Attestations:  Scribe Attestation     Fanta Wood acting as a scribe for and in the presence of Colette Reyes PA-C      May 14, 2018 at 11:04 AM       Provider Attestation:      I personally performed the services described in the documentation, reviewed the documentation, as recorded by the scribe in my presence, and it accurately and completely records my words and actions.  May 14, 2018 at 11:04 AM - Colette Reyes PA-C  _______________________________

## 2018-05-14 NOTE — ED TRIAGE NOTES
Pt c/o nose bleed on & off when she blows her nose. Pt is pregnant & c/o cold symptoms. Pt nose is not actively bleeding at this time.

## 2018-05-14 NOTE — DISCHARGE INSTRUCTIONS
Only use Afrin for 3 days max. Hold pressure and lean forward during episodes. Return to ER if you develop dizziness, lightheadedness, severe headaches. Nosebleeds: Care Instructions  Your Care Instructions    Nosebleeds are common, especially if you have colds or allergies. Many things can cause a nosebleed. Some nosebleeds stop on their own with pressure. Others need packing. Some get cauterized (sealed). If you have gauze or other packing materials in your nose, you will need to follow up with your doctor to have the packing removed. You may need more treatment if you get nosebleeds a lot. The doctor has checked you carefully, but problems can develop later. If you notice any problems or new symptoms, get medical treatment right away. Follow-up care is a key part of your treatment and safety. Be sure to make and go to all appointments, and call your doctor if you are having problems. It's also a good idea to know your test results and keep a list of the medicines you take. How can you care for yourself at home? · If you get another nosebleed:  ¨ Sit up and tilt your head slightly forward. This keeps blood from going down your throat. ¨ Use your thumb and index finger to pinch your nose shut for 10 minutes. Use a clock. Do not check to see if the bleeding has stopped before the 10 minutes are up. If the bleeding has not stopped, pinch your nose shut for another 10 minutes. ¨ When the bleeding has stopped, try not to pick, rub, or blow your nose for 12 hours. Avoiding these things helps keep your nose from bleeding again. · If your doctor prescribed antibiotics, take them as directed. Do not stop taking them just because you feel better. You need to take the full course of antibiotics. To prevent nosebleeds  · Do not blow your nose too hard. · Try not to lift or strain after a nosebleed. · Raise your head on a pillow while you sleep.   · Put a thin layer of a saline- or water-based nasal gel, such as NasoGel, inside your nose. Put it on the septum, which divides your nostrils. This will prevent dryness that can cause nosebleeds. · Use a vaporizer or humidifier to add moisture to your bedroom. Follow the directions for cleaning the machine. · Do not use aspirin, ibuprofen (Advil, Motrin), or naproxen (Aleve) for 36 to 48 hours after a nosebleed unless your doctor tells you to. You can use acetaminophen (Tylenol) for pain relief. · Talk to your doctor about stopping any other medicines you are taking. Some medicines may make you more likely to get a nosebleed. · Do not use cold medicines or nasal sprays without first talking to your doctor. They can make your nose dry. When should you call for help? Call 911 anytime you think you may need emergency care. For example, call if:  ? · You passed out (lost consciousness). ?Call your doctor now or seek immediate medical care if:  ? · You get another nosebleed and your nose is still bleeding after you have applied pressure 3 times for 10 minutes each time (30 minutes total). ? · There is a lot of blood running down the back of your throat even after you pinch your nose and tilt your head forward. ? · You have a fever. ? · You have sinus pain. ? Watch closely for changes in your health, and be sure to contact your doctor if:  ? · You get nosebleeds often, even if they stop. ? · You do not get better as expected. Where can you learn more? Go to http://georgina-tavo.info/. Enter S156 in the search box to learn more about \"Nosebleeds: Care Instructions. \"  Current as of: March 20, 2017  Content Version: 11.4  © 3974-9905 [employer]. Care instructions adapted under license by [employer] (which disclaims liability or warranty for this information).  If you have questions about a medical condition or this instruction, always ask your healthcare professional. Mariann Coats disclaims any warranty or liability for your use of this information.

## 2018-08-04 ENCOUNTER — HOSPITAL ENCOUNTER (EMERGENCY)
Age: 18
Discharge: HOME | End: 2018-08-04
Attending: EMERGENCY MEDICINE
Payer: MEDICAID

## 2018-08-04 VITALS
TEMPERATURE: 99 F | BODY MASS INDEX: 39.61 KG/M2 | HEART RATE: 97 BPM | RESPIRATION RATE: 18 BRPM | HEIGHT: 64 IN | SYSTOLIC BLOOD PRESSURE: 106 MMHG | OXYGEN SATURATION: 96 % | DIASTOLIC BLOOD PRESSURE: 69 MMHG | WEIGHT: 232 LBS

## 2018-08-04 DIAGNOSIS — N49.1 ABSCESS OF TUNICA VAGINALIS: ICD-10-CM

## 2018-08-04 DIAGNOSIS — N76.0 ABSCESS OF VAGINA: Primary | ICD-10-CM

## 2018-08-04 PROCEDURE — 75810000117 HC INC/DRN VULVA/PERINEUM

## 2018-08-04 PROCEDURE — 74011250637 HC RX REV CODE- 250/637: Performed by: EMERGENCY MEDICINE

## 2018-08-04 PROCEDURE — 99283 EMERGENCY DEPT VISIT LOW MDM: CPT

## 2018-08-04 RX ORDER — CLINDAMYCIN HYDROCHLORIDE 300 MG/1
300 CAPSULE ORAL 4 TIMES DAILY
Qty: 40 CAP | Refills: 0 | Status: SHIPPED | OUTPATIENT
Start: 2018-08-04 | End: 2018-08-14

## 2018-08-04 RX ORDER — ACETAMINOPHEN AND CODEINE PHOSPHATE 300; 30 MG/1; MG/1
1 TABLET ORAL
Qty: 12 TAB | Refills: 0 | Status: SHIPPED | OUTPATIENT
Start: 2018-08-04 | End: 2018-08-24

## 2018-08-04 RX ORDER — CLINDAMYCIN HYDROCHLORIDE 150 MG/1
300 CAPSULE ORAL
Status: COMPLETED | OUTPATIENT
Start: 2018-08-04 | End: 2018-08-04

## 2018-08-04 RX ADMIN — CLINDAMYCIN HYDROCHLORIDE 300 MG: 150 CAPSULE ORAL at 22:23

## 2018-08-05 NOTE — ED PROVIDER NOTES
EMERGENCY DEPARTMENT HISTORY AND PHYSICAL EXAM 
 
10:02 PM 
 
 
Date: 8/4/2018 Patient Name: Isaias Zepeda History of Presenting Illness Chief Complaint Patient presents with  Abscess History Provided By: Patient Chief Complaint: abscess to right labia Duration:  Weeks Timing:  Gradual and Worsening Location:  Quality: Aching Severity: Severe Modifying Factors: none Associated Symptoms: denies any other associated signs or symptoms Additional History (Context): Isaias Zepeda is a 19 y.o. female with No significant past medical history who is 18 weeks pregnant presents with abscess on the right side of her labia causing vaginal pain for the past week. Nothing makes the pain better or worse. Has not done anything to treat sx at home. Does have prenatal care; plans to deliver at [facility]. She has not followed up with her OB/GYN about abscess. Other wise has been fine. Denies fever, chills, abdominal pain, nausea, vomiting, drainage from site, vaginal bleeding, vaginal discharge, dysuria, hematuria, or any other associated sx. No other complaints or concerns. PCP: Sergo Piña MD 
 
Current Outpatient Prescriptions Medication Sig Dispense Refill  clindamycin (CLEOCIN) 300 mg capsule Take 1 Cap by mouth four (4) times daily for 10 days. 40 Cap 0  
 acetaminophen-codeine (TYLENOL-CODEINE #3) 300-30 mg per tablet Take 1 Tab by mouth every six (6) hours as needed for Pain. Max Daily Amount: 4 Tabs. 12 Tab 0  
 sod bicarb-sod chlor-neti pot pkdv 1 Box by Nasal route daily. 1 Each 0  
 prenatal vit-iron fumarate-fa 27 mg iron- 0.8 mg tab tablet Take 1 Tab by mouth daily. Past History Past Medical History: 
History reviewed. No pertinent past medical history. Past Surgical History: 
History reviewed. No pertinent surgical history. Family History: 
History reviewed. No pertinent family history. Social History: 
Social History Substance Use Topics  Smoking status: Never Smoker  Smokeless tobacco: Never Used  Alcohol use No  
 
 
Allergies: 
No Known Allergies Review of Systems Review of Systems Constitutional: Negative for chills and fever. Respiratory: Negative for shortness of breath. Cardiovascular: Negative for chest pain. Gastrointestinal: Negative for abdominal pain, nausea and vomiting. Genitourinary: Positive for vaginal pain. Negative for dysuria, hematuria, pelvic pain, vaginal bleeding and vaginal discharge. Skin: Positive for wound (vaginal abscess). All other systems reviewed and are negative. Physical Exam  
 
Visit Vitals  /69 (BP 1 Location: Left arm)  Pulse 97  Temp 99 °F (37.2 °C)  Resp 18  Ht 5' 4\" (1.626 m)  Wt 105.2 kg (232 lb)  SpO2 96%  BMI 39.82 kg/m2 Physical Exam  
Constitutional: She is oriented to person, place, and time. She appears well-developed and well-nourished. No distress. HENT:  
Head: Normocephalic. Right Ear: External ear normal.  
Left Ear: External ear normal.  
Mouth/Throat: No oropharyngeal exudate. Eyes: Conjunctivae and EOM are normal. Pupils are equal, round, and reactive to light. Right eye exhibits no discharge. Left eye exhibits no discharge. No scleral icterus. Neck: Normal range of motion. Neck supple. No JVD present. No tracheal deviation present. No thyromegaly present. Cardiovascular: Normal rate, regular rhythm, normal heart sounds and intact distal pulses. Exam reveals no gallop and no friction rub. No murmur heard. Pulmonary/Chest: Effort normal and breath sounds normal. No stridor. No respiratory distress. She has no wheezes. She has no rales. She exhibits no tenderness. Abdominal: Soft. Bowel sounds are normal. She exhibits no distension and no mass. There is no tenderness. There is no rebound and no guarding. Genitourinary: No labial fusion. There is lesion on the right labia.  There is no rash on the right labia. There is no rash on the left labia. Genitourinary Comments: 3CM abscess at beneath lower right labia majoria. Musculoskeletal: Normal range of motion. She exhibits no edema or tenderness. Lymphadenopathy:  
  She has no cervical adenopathy. Neurological: She is alert and oriented to person, place, and time. She displays normal reflexes. No cranial nerve deficit. She exhibits normal muscle tone. Coordination normal.  
Skin: Skin is warm and dry. No rash noted. She is not diaphoretic. No erythema. No pallor. Nursing note and vitals reviewed. Diagnostic Study Results Medical Decision Making I am the first provider for this patient. I reviewed the vital signs, available nursing notes, past medical history, past surgical history, family history and social history. Vital Signs-Reviewed the patient's vital signs. Pulse Oximetry Analysis - 96% RA non hypoxic Records Reviewed: Nursing Notes and Old Medical Records (Time of Review: 10:02 PM) 
 
ED Course: Progress Notes, Reevaluation, and Consults: The patient remained stable. Provider Notes (Medical Decision Making): abscess, cellulitis, cyst, Procedures:  
I&D Abcess Simple Date/Time: 8/4/2018 10:06 PM 
Performed by: Purnima Nicholas Authorized by: Purnima Nicholas Consent:  
  Consent obtained:  Verbal and written Consent given by:  Patient Risks discussed:  Bleeding, incomplete drainage, pain, infection and damage to other organs Alternatives discussed:  No treatment Universal protocol:  
  Procedure explained and questions answered to patient or proxy's satisfaction: yes Relevant documents present and verified: yes Required blood products, implants, devices, and special equipment available: no   
  Site/side marked: yes Immediately prior to procedure a time out was called: yes Patient identity confirmed:  Verbally with patient and arm band Location:  
  Type:  Abscess Location:  Anogenital (right lower labia minora) Pre-procedure details:  
  Skin preparation:  Hibiclens Anesthesia (see MAR for exact dosages): Anesthesia method:  Local infiltration Local anesthetic:  Lidocaine 1% w/o epi Procedure type:  
  Complexity:  Simple Procedure details:  
  Needle aspiration: no Incision types:  Stab incision Scalpel blade:  11 Wound management:  Probed and deloculated Drainage:  Purulent Drainage amount:  Scant Wound treatment:  Wound left open Packing materials:  None Post-procedure details:  
  Patient tolerance of procedure: Tolerated well, no immediate complications Diagnosis Clinical Impression: abscess Disposition: D/C home Follow-up Information Follow up With Details Comments Contact Info Emanuel Escamilla MD Call in 2 days Follow up. [address] [facility] [phone] [facility] [address] [phone] [facility] EMERGENCY DEPT  If symptoms worsen, As needed Daryn Anne Kang [facility] [address] [phone] Patient's Medications Start Taking ACETAMINOPHEN-CODEINE (TYLENOL-CODEINE #3) 300-30 MG PER TABLET    Take 1 Tab by mouth every six (6) hours as needed for Pain. Max Daily Amount: 4 Tabs. CLINDAMYCIN (CLEOCIN) 300 MG CAPSULE    Take 1 Cap by mouth four (4) times daily for 10 days. Continue Taking PRENATAL VIT-IRON FUMARATE-FA 27 MG IRON- 0.8 MG TAB TABLET    Take 1 Tab by mouth daily. SOD BICARB-SOD CHLOR-NETI POT PKDV    1 Box by Nasal route daily. These Medications have changed No medications on file Stop Taking No medications on file  
 
_______________________________ Attestations: 
Scribe Attestation Rocio Malik acting as a scribe for and in the presence of Asher Oneal MD     
August 04, 2018 at 10:02 PM 
    
Provider Attestation:     
I personally performed the services described in the documentation, reviewed the documentation, as recorded by the scribe in my presence, and it accurately and completely records my words and actions. August 04, 2018 at 10:02 PM - Jluis Dillon MD   
_______________________________

## 2018-08-05 NOTE — ED TRIAGE NOTES
Patient states she is 18 weeks pregnant and states she has a large abscess to her genital area. She states she went to the bathroom \"and there was blood so I don't know if it's from the boil or from the baby\". Denies abdominal pain or back pain.

## 2018-08-24 ENCOUNTER — HOSPITAL ENCOUNTER (EMERGENCY)
Age: 18
Discharge: HOME | End: 2018-08-24
Attending: EMERGENCY MEDICINE
Payer: MEDICAID

## 2018-08-24 ENCOUNTER — APPOINTMENT (OUTPATIENT)
Dept: GENERAL RADIOLOGY | Age: 18
End: 2018-08-24
Attending: EMERGENCY MEDICINE
Payer: MEDICAID

## 2018-08-24 VITALS
RESPIRATION RATE: 18 BRPM | HEIGHT: 64 IN | SYSTOLIC BLOOD PRESSURE: 106 MMHG | WEIGHT: 237 LBS | HEART RATE: 87 BPM | OXYGEN SATURATION: 100 % | TEMPERATURE: 98.9 F | BODY MASS INDEX: 40.46 KG/M2 | DIASTOLIC BLOOD PRESSURE: 54 MMHG

## 2018-08-24 DIAGNOSIS — K92.0 HEMATEMESIS WITH NAUSEA: ICD-10-CM

## 2018-08-24 DIAGNOSIS — R07.89 ATYPICAL CHEST PAIN: ICD-10-CM

## 2018-08-24 DIAGNOSIS — R10.13 DYSPEPSIA: ICD-10-CM

## 2018-08-24 DIAGNOSIS — R11.2 NON-INTRACTABLE VOMITING WITH NAUSEA, UNSPECIFIED VOMITING TYPE: Primary | ICD-10-CM

## 2018-08-24 LAB
ANION GAP SERPL CALC-SCNC: 8 MMOL/L (ref 3–18)
APPEARANCE UR: CLEAR
ATRIAL RATE: 77 BPM
BASOPHILS # BLD: 0 K/UL (ref 0–0.1)
BASOPHILS NFR BLD: 0 % (ref 0–2)
BILIRUB UR QL: NEGATIVE
BUN SERPL-MCNC: 6 MG/DL (ref 7–18)
BUN/CREAT SERPL: 11 (ref 12–20)
CALCIUM SERPL-MCNC: 8.8 MG/DL (ref 8.5–10.1)
CALCULATED P AXIS, ECG09: 3 DEGREES
CALCULATED R AXIS, ECG10: 25 DEGREES
CALCULATED T AXIS, ECG11: 25 DEGREES
CHLORIDE SERPL-SCNC: 103 MMOL/L (ref 100–108)
CK MB CFR SERPL CALC: NORMAL % (ref 0–4)
CK MB SERPL-MCNC: <1 NG/ML (ref 5–25)
CK SERPL-CCNC: 63 U/L (ref 26–192)
CO2 SERPL-SCNC: 25 MMOL/L (ref 21–32)
COLOR UR: YELLOW
CREAT SERPL-MCNC: 0.57 MG/DL (ref 0.6–1.3)
DIAGNOSIS, 93000: NORMAL
DIFFERENTIAL METHOD BLD: ABNORMAL
EOSINOPHIL # BLD: 0.1 K/UL (ref 0–0.4)
EOSINOPHIL NFR BLD: 1 % (ref 0–5)
EPITH CASTS URNS QL MICRO: ABNORMAL /LPF (ref 0–5)
ERYTHROCYTE [DISTWIDTH] IN BLOOD BY AUTOMATED COUNT: 17.6 % (ref 11.6–14.5)
GLUCOSE SERPL-MCNC: 87 MG/DL (ref 74–99)
GLUCOSE UR STRIP.AUTO-MCNC: NEGATIVE MG/DL
HCT VFR BLD AUTO: 30.6 % (ref 35–45)
HGB BLD-MCNC: 10 G/DL (ref 12–16)
HGB UR QL STRIP: NEGATIVE
KETONES UR QL STRIP.AUTO: NEGATIVE MG/DL
LEUKOCYTE ESTERASE UR QL STRIP.AUTO: ABNORMAL
LYMPHOCYTES # BLD: 2.1 K/UL (ref 0.9–3.6)
LYMPHOCYTES NFR BLD: 16 % (ref 21–52)
MCH RBC QN AUTO: 18.7 PG (ref 24–34)
MCHC RBC AUTO-ENTMCNC: 32.7 G/DL (ref 31–37)
MCV RBC AUTO: 57.1 FL (ref 74–97)
MONOCYTES # BLD: 0.5 K/UL (ref 0.05–1.2)
MONOCYTES NFR BLD: 4 % (ref 3–10)
MUCOUS THREADS URNS QL MICRO: ABNORMAL /LPF
NEUTS SEG # BLD: 10.2 K/UL (ref 1.8–8)
NEUTS SEG NFR BLD: 79 % (ref 40–73)
NITRITE UR QL STRIP.AUTO: NEGATIVE
P-R INTERVAL, ECG05: 124 MS
PH UR STRIP: 7 [PH] (ref 5–8)
PLATELET # BLD AUTO: 324 K/UL (ref 135–420)
PLATELET COMMENTS,PCOM: ABNORMAL
POTASSIUM SERPL-SCNC: 3.6 MMOL/L (ref 3.5–5.5)
PROT UR STRIP-MCNC: NEGATIVE MG/DL
Q-T INTERVAL, ECG07: 396 MS
QRS DURATION, ECG06: 88 MS
QTC CALCULATION (BEZET), ECG08: 448 MS
RBC # BLD AUTO: 5.36 M/UL (ref 4.2–5.3)
RBC #/AREA URNS HPF: ABNORMAL /HPF (ref 0–5)
RBC MORPH BLD: ABNORMAL
RBC MORPH BLD: ABNORMAL
SODIUM SERPL-SCNC: 136 MMOL/L (ref 136–145)
SP GR UR REFRACTOMETRY: 1.02 (ref 1–1.03)
TROPONIN I SERPL-MCNC: <0.02 NG/ML (ref 0–0.06)
UROBILINOGEN UR QL STRIP.AUTO: 1 EU/DL (ref 0.2–1)
VENTRICULAR RATE, ECG03: 77 BPM
WBC # BLD AUTO: 12.9 K/UL (ref 4.6–13.2)
WBC URNS QL MICRO: ABNORMAL /HPF (ref 0–4)

## 2018-08-24 PROCEDURE — 93005 ELECTROCARDIOGRAM TRACING: CPT

## 2018-08-24 PROCEDURE — 85025 COMPLETE CBC W/AUTO DIFF WBC: CPT | Performed by: EMERGENCY MEDICINE

## 2018-08-24 PROCEDURE — 96361 HYDRATE IV INFUSION ADD-ON: CPT

## 2018-08-24 PROCEDURE — 99284 EMERGENCY DEPT VISIT MOD MDM: CPT

## 2018-08-24 PROCEDURE — 80048 BASIC METABOLIC PNL TOTAL CA: CPT | Performed by: EMERGENCY MEDICINE

## 2018-08-24 PROCEDURE — 81001 URINALYSIS AUTO W/SCOPE: CPT | Performed by: EMERGENCY MEDICINE

## 2018-08-24 PROCEDURE — 74011250636 HC RX REV CODE- 250/636: Performed by: EMERGENCY MEDICINE

## 2018-08-24 PROCEDURE — 71045 X-RAY EXAM CHEST 1 VIEW: CPT

## 2018-08-24 PROCEDURE — 82550 ASSAY OF CK (CPK): CPT | Performed by: EMERGENCY MEDICINE

## 2018-08-24 PROCEDURE — 74011250637 HC RX REV CODE- 250/637: Performed by: EMERGENCY MEDICINE

## 2018-08-24 PROCEDURE — 96374 THER/PROPH/DIAG INJ IV PUSH: CPT

## 2018-08-24 RX ORDER — FAMOTIDINE 10 MG/1
10 TABLET ORAL 2 TIMES DAILY
Qty: 10 TAB | Refills: 0 | Status: SHIPPED | OUTPATIENT
Start: 2018-08-24 | End: 2018-08-29

## 2018-08-24 RX ORDER — METOCLOPRAMIDE HYDROCHLORIDE 5 MG/ML
5 INJECTION INTRAMUSCULAR; INTRAVENOUS
Status: COMPLETED | OUTPATIENT
Start: 2018-08-24 | End: 2018-08-24

## 2018-08-24 RX ORDER — ACETAMINOPHEN 325 MG/1
975 TABLET ORAL ONCE
Status: COMPLETED | OUTPATIENT
Start: 2018-08-24 | End: 2018-08-24

## 2018-08-24 RX ADMIN — ACETAMINOPHEN 975 MG: 325 TABLET ORAL at 11:19

## 2018-08-24 RX ADMIN — METOCLOPRAMIDE 5 MG: 5 INJECTION, SOLUTION INTRAMUSCULAR; INTRAVENOUS at 11:20

## 2018-08-24 RX ADMIN — SODIUM CHLORIDE 1000 ML: 900 INJECTION, SOLUTION INTRAVENOUS at 11:19

## 2018-08-24 NOTE — ED TRIAGE NOTES
Patient developed some chest discomfort around 0500. She stated she had difficulty taking a deep breath. Patient states she only has the pain when she breathes in deeply. She also had one episode of vomiting this morning. Patient is 21 weeks pregnant. At the end of triage, patient stated that there was dark red blood in her vomit.

## 2018-08-24 NOTE — DISCHARGE INSTRUCTIONS
Indigestion (Dyspepsia or Heartburn): Care Instructions  Your Care Instructions  Sometimes it can be hard to pinpoint the cause of indigestion. (It is also called dyspepsia or heartburn.) Most cases of an upset stomach with bloating, burning, burping, and nausea are minor and go away within several hours. Home treatment and over-the-counter medicine often are able to control symptoms. But if you take medicine to relieve your indigestion without making diet and lifestyle changes, your symptoms are likely to return again and again. If you get indigestion often, it may be a sign of a more serious medical problem. Be sure to follow up with your doctor, who may want to do tests to be sure of the cause of your indigestion. Follow-up care is a key part of your treatment and safety. Be sure to make and go to all appointments, and call your doctor if you are having problems. It's also a good idea to know your test results and keep a list of the medicines you take. How can you care for yourself at home? · Your doctor may recommend over-the-counter medicine. For mild or occasional indigestion, antacids such as Gaviscon, Mylanta, Maalox, or Tums, may help. Be safe with medicines. Be careful when you take over-the-counter antacid medicines. Many of these medicines have aspirin in them. Read the label to make sure that you are not taking more than the recommended dose. Too much aspirin can be harmful. · Your doctor also may recommend over-the-counter acid reducers, such as Pepcid AC, Tagamet HB, Zantac 75, or Prilosec. Read and follow all instructions on the label. If you use these medicines often, talk with your doctor. · Change your eating habits. ¨ It's best to eat several small meals instead of two or three large meals. ¨ After you eat, wait 2 to 3 hours before you lie down. ¨ Chocolate, mint, and alcohol can make GERD worse.   ¨ Spicy foods, foods that have a lot of acid (like tomatoes and oranges), and coffee can make GERD symptoms worse in some people. If your symptoms are worse after you eat a certain food, you may want to stop eating that food to see if your symptoms get better. · Do not smoke or chew tobacco. Smoking can make GERD worse. If you need help quitting, talk to your doctor about stop-smoking programs and medicines. These can increase your chances of quitting for good. · If you have GERD symptoms at night, raise the head of your bed 6 to 8 inches. You can do this by putting the frame on blocks or placing a foam wedge under the head of your mattress. (Adding extra pillows does not work.)  · Do not wear tight clothing around your middle. · Lose weight if you need to. Losing just 5 to 10 pounds can help. · Do not take anti-inflammatory medicines, such as aspirin, ibuprofen (Advil, Motrin), or naproxen (Aleve). These can irritate the stomach. If you need a pain medicine, try acetaminophen (Tylenol), which does not cause stomach upset. When should you call for help? Call your doctor now or seek immediate medical care if:    · You have new or worse belly pain.     · You are vomiting.    Watch closely for changes in your health, and be sure to contact your doctor if:    · You have new or worse symptoms of indigestion.     · You have trouble or pain swallowing.     · You are losing weight.     · You do not get better as expected. Where can you learn more? Go to http://georgina-tavo.info/. Enter B542 in the search box to learn more about \"Indigestion (Dyspepsia or Heartburn): Care Instructions. \"  Current as of: May 12, 2017  Content Version: 11.7  © 3535-3912 [employer]. Care instructions adapted under license by [employer] (which disclaims liability or warranty for this information).  If you have questions about a medical condition or this instruction, always ask your healthcare professional. [address] any warranty or liability for your use of this information. Nausea and Vomiting: Care Instructions  Your Care Instructions    When you are nauseated, you may feel weak and sweaty and notice a lot of saliva in your mouth. Nausea often leads to vomiting. Most of the time you do not need to worry about nausea and vomiting, but they can be signs of other illnesses. Two common causes of nausea and vomiting are stomach flu and food poisoning. Nausea and vomiting from viral stomach flu will usually start to improve within 24 hours. Nausea and vomiting from food poisoning may last from 12 to 48 hours. The doctor has checked you carefully, but problems can develop later. If you notice any problems or new symptoms, get medical treatment right away. Follow-up care is a key part of your treatment and safety. Be sure to make and go to all appointments, and call your doctor if you are having problems. It's also a good idea to know your test results and keep a list of the medicines you take. How can you care for yourself at home? · To prevent dehydration, drink plenty of fluids, enough so that your urine is light yellow or clear like water. Choose water and other caffeine-free clear liquids until you feel better. If you have kidney, heart, or liver disease and have to limit fluids, talk with your doctor before you increase the amount of fluids you drink. · Rest in bed until you feel better. · When you are able to eat, try clear soups, mild foods, and liquids until all symptoms are gone for 12 to 48 hours. Other good choices include dry toast, crackers, cooked cereal, and gelatin dessert, such as Jell-O. When should you call for help? Call 911 anytime you think you may need emergency care. For example, call if:    · You passed out (lost consciousness).    Call your doctor now or seek immediate medical care if:    · You have symptoms of dehydration, such as:  ¨ Dry eyes and a dry mouth. ¨ Passing only a little dark urine.   ¨ Feeling thirstier than usual.     · You have new or worsening belly pain.     · You have a new or higher fever.     · You vomit blood or what looks like coffee grounds.    Watch closely for changes in your health, and be sure to contact your doctor if:    · You have ongoing nausea and vomiting.     · Your vomiting is getting worse.     · Your vomiting lasts longer than 2 days.     · You are not getting better as expected. Where can you learn more? Go to http://georgina-tavo.info/. Enter 25 473173 in the search box to learn more about \"Nausea and Vomiting: Care Instructions. \"  Current as of: November 20, 2017  Content Version: 11.7  © 9923-5432 [employer]. Care instructions adapted under license by [employer] (which disclaims liability or warranty for this information). If you have questions about a medical condition or this instruction, always ask your healthcare professional. [place] [address] any warranty or liability for your use of this information. Musculoskeletal Chest Pain: Care Instructions  Your Care Instructions    Chest pain is not always a sign that something is wrong with your heart or that you have another serious problem. The doctor thinks your chest pain is caused by strained muscles or ligaments, inflamed chest cartilage, or another problem in your chest, rather than by your heart. You may need more tests to find the cause of your chest pain. Follow-up care is a key part of your treatment and safety. Be sure to make and go to all appointments, and call your doctor if you are having problems. It's also a good idea to know your test results and keep a list of the medicines you take. How can you care for yourself at home? · Take pain medicines exactly as directed. ¨ If the doctor gave you a prescription medicine for pain, take it as prescribed.   ¨ If you are not taking a prescription pain medicine, ask your doctor if you can take an over-the-counter medicine. · Rest and protect the sore area. · Stop, change, or take a break from any activity that may be causing your pain or soreness. · Put ice or a cold pack on the sore area for 10 to 20 minutes at a time. Try to do this every 1 to 2 hours for the next 3 days (when you are awake) or until the swelling goes down. Put a thin cloth between the ice and your skin. · After 2 or 3 days, apply a heating pad set on low or a warm cloth to the area that hurts. Some doctors suggest that you go back and forth between hot and cold. · Do not wrap or tape your ribs for support. This may cause you to take smaller breaths, which could increase your risk of lung problems. · Mentholated creams such as Bengay or Icy Hot may soothe sore muscles. Follow the instructions on the package. · Follow your doctor's instructions for exercising. · Gentle stretching and massage may help you get better faster. Stretch slowly to the point just before pain begins, and hold the stretch for at least 15 to 30 seconds. Do this 3 or 4 times a day. Stretch just after you have applied heat. · As your pain gets better, slowly return to your normal activities. Any increased pain may be a sign that you need to rest a while longer. When should you call for help? Call 911 anytime you think you may need emergency care. For example, call if:    · You have chest pain or pressure. This may occur with:  ¨ Sweating. ¨ Shortness of breath. ¨ Nausea or vomiting. ¨ Pain that spreads from the chest to the neck, jaw, or one or both shoulders or arms. ¨ Dizziness or lightheadedness. ¨ A fast or uneven pulse. After calling 911, chew 1 adult-strength aspirin. Wait for an ambulance.  Do not try to drive yourself.     · You have sudden chest pain and shortness of breath, or you cough up blood.    Call your doctor now or seek immediate medical care if:    · You have any trouble breathing.     · Your chest pain gets worse.     · Your chest pain occurs consistently with exercise and is relieved by rest.    Watch closely for changes in your health, and be sure to contact your doctor if:    · Your chest pain does not get better after 1 week. Where can you learn more? Go to http://georgina-tavo.info/. Enter V293 in the search box to learn more about \"Musculoskeletal Chest Pain: Care Instructions. \"  Current as of: November 20, 2017  Content Version: 11.7  © 4853-4813 [employer]. Care instructions adapted under license by [employer] (which disclaims liability or warranty for this information). If you have questions about a medical condition or this instruction, always ask your healthcare professional. [place] [address] any warranty or liability for your use of this information.

## 2018-08-24 NOTE — LETTER
NOTIFICATION RETURN TO WORK / SCHOOL 
 
8/24/2018 1:21 PM 
 
Ms. Maddie Negron [address] [facility] [address] To Whom It May Concern: 
 
Maddie Negron is currently under the care of [id number] [place] [address] EMERGENCY DEPT. She will return to work/school on: 08/25/2018 If there are questions or concerns please have the patient contact our office. Sincerely, Misty Kim, DO

## 2018-08-24 NOTE — ED PROVIDER NOTES
EMERGENCY DEPARTMENT HISTORY AND PHYSICAL EXAM    9:31 AM      Date: 8/24/2018  Patient Name: Nain Farris    History of Presenting Illness     Chief Complaint   Patient presents with    Chest Pain         History Provided By: Patient    Chief Complaint: N/V  Duration:  Days  Timing:  Acute  Location: GI/chest  Quality: discomfort  Severity: Mild  Modifying Factors: chest discomfort due to excessive vomiting   Associated Symptoms: CP      Additional History (Context): Nain Farris is a 19 y.o. female with No significant past medical history who presents with N/V that started yesterday evening. States she has had multiple grill cheese sandwiches at home prior to sx. Had 1 episode of vomiting that lasted for several minutes; near the end saw some blood streaking in the vomit. Not having abdominal pain, back pain, dysuria, or vaginal bleeding. Notes she is 20 weeks pregnant. Denies any SOB. Did have some chest discomfort after vomiting. No dizziness or numbness and denies any leg swelling. States she has an appointment with Dr. Freddy Fan already scheduled. PCP: Lakeisha Figueroa MD      Past History     Past Medical History:  History reviewed. No pertinent past medical history. Past Surgical History:  History reviewed. No pertinent surgical history. Family History:  History reviewed. No pertinent family history. Social History:  Social History   Substance Use Topics    Smoking status: Never Smoker    Smokeless tobacco: Never Used    Alcohol use No       Allergies:  No Known Allergies      Review of Systems       Review of Systems   Constitutional: Negative for fever. HENT: Negative for sore throat. Eyes: Negative for redness. Respiratory: Negative for shortness of breath and wheezing. Cardiovascular: Positive for chest pain. Gastrointestinal: Positive for nausea and vomiting. Negative for abdominal pain.    Genitourinary: Negative for dysuria, pelvic pain, vaginal bleeding and vaginal pain.   Musculoskeletal: Negative for neck stiffness. Skin: Negative for pallor. Neurological: Negative for headaches. Hematological: Does not bruise/bleed easily. All other systems reviewed and are negative. Physical Exam     Visit Vitals    /54    Pulse 87    Temp 98.9 °F (37.2 °C)    Resp 18    Ht 5' 4.25\" (1.632 m)    Wt 107.5 kg (237 lb)    LMP 03/29/2018    SpO2 100%    BMI 40.36 kg/m2         Physical Exam   Constitutional: She is oriented to person, place, and time. She appears well-developed and well-nourished. No distress. HENT:   Head: Normocephalic and atraumatic. Mouth/Throat: Oropharynx is clear and moist. Mucous membranes are dry. Eyes: Conjunctivae are normal. Pupils are equal, round, and reactive to light. No scleral icterus. Neck: Normal range of motion. Neck supple. Cardiovascular: Normal rate and intact distal pulses. Capillary refill < 3 seconds   Pulmonary/Chest: Effort normal and breath sounds normal. No respiratory distress. She has no wheezes. Abdominal: Soft. Bowel sounds are normal. She exhibits no distension. There is no tenderness. Gravid soft and non tender   Musculoskeletal: Normal range of motion. She exhibits no edema. Lymphadenopathy:     She has no cervical adenopathy. Neurological: She is alert and oriented to person, place, and time. No cranial nerve deficit. Skin: Skin is warm and dry. She is not diaphoretic. Nursing note and vitals reviewed.         Diagnostic Study Results     Labs -  Recent Results (from the past 12 hour(s))   EKG, 12 LEAD, INITIAL    Collection Time: 08/24/18  9:30 AM   Result Value Ref Range    Ventricular Rate 77 BPM    Atrial Rate 77 BPM    P-R Interval 124 ms    QRS Duration 88 ms    Q-T Interval 396 ms    QTC Calculation (Bezet) 448 ms    Calculated P Axis 3 degrees    Calculated R Axis 25 degrees    Calculated T Axis 25 degrees    Diagnosis       Normal sinus rhythm  Normal ECG  When compared with ECG of 12-MAY-2018 11:12,  No significant change was found     CBC WITH AUTOMATED DIFF    Collection Time: 08/24/18  9:55 AM   Result Value Ref Range    WBC 12.9 4.6 - 13.2 K/uL    RBC 5.36 (H) 4.20 - 5.30 M/uL    HGB 10.0 (L) 12.0 - 16.0 g/dL    HCT 30.6 (L) 35.0 - 45.0 %    MCV 57.1 (L) 74.0 - 97.0 FL    MCH 18.7 (L) 24.0 - 34.0 PG    MCHC 32.7 31.0 - 37.0 g/dL    RDW 17.6 (H) 11.6 - 14.5 %    PLATELET 439 609 - 027 K/uL    NEUTROPHILS 79 (H) 40 - 73 %    LYMPHOCYTES 16 (L) 21 - 52 %    MONOCYTES 4 3 - 10 %    EOSINOPHILS 1 0 - 5 %    BASOPHILS 0 0 - 2 %    ABS. NEUTROPHILS 10.2 (H) 1.8 - 8.0 K/UL    ABS. LYMPHOCYTES 2.1 0.9 - 3.6 K/UL    ABS. MONOCYTES 0.5 0.05 - 1.2 K/UL    ABS. EOSINOPHILS 0.1 0.0 - 0.4 K/UL    ABS.  BASOPHILS 0.0 0.0 - 0.1 K/UL    DF AUTOMATED      PLATELET COMMENTS ADEQUATE PLATELETS      RBC COMMENTS ANISOCYTOSIS  2+        RBC COMMENTS MICROCYTOSIS  1+       METABOLIC PANEL, BASIC    Collection Time: 08/24/18  9:55 AM   Result Value Ref Range    Sodium 136 136 - 145 mmol/L    Potassium 3.6 3.5 - 5.5 mmol/L    Chloride 103 100 - 108 mmol/L    CO2 25 21 - 32 mmol/L    Anion gap 8 3.0 - 18 mmol/L    Glucose 87 74 - 99 mg/dL    BUN 6 (L) 7.0 - 18 MG/DL    Creatinine 0.57 (L) 0.6 - 1.3 MG/DL    BUN/Creatinine ratio 11 (L) 12 - 20      GFR est AA >60 >60 ml/min/1.73m2    GFR est non-AA >60 >60 ml/min/1.73m2    Calcium 8.8 8.5 - 10.1 MG/DL   CARDIAC PANEL,(CK, CKMB & TROPONIN)    Collection Time: 08/24/18  9:55 AM   Result Value Ref Range    CK 63 26 - 192 U/L    CK - MB <1.0 <3.6 ng/ml    CK-MB Index  0.0 - 4.0 %     CALCULATION NOT PERFORMED WHEN RESULT IS BELOW LINEAR LIMIT    Troponin-I, Qt. <0.02 0.00 - 0.06 NG/ML   URINALYSIS W/ RFLX MICROSCOPIC    Collection Time: 08/24/18 11:20 AM   Result Value Ref Range    Color YELLOW      Appearance CLEAR      Specific gravity 1.017 1.005 - 1.030      pH (UA) 7.0 5.0 - 8.0      Protein NEGATIVE  NEG mg/dL    Glucose NEGATIVE  NEG mg/dL    Ketone NEGATIVE  NEG mg/dL    Bilirubin NEGATIVE  NEG      Blood NEGATIVE  NEG      Urobilinogen 1.0 0.2 - 1.0 EU/dL    Nitrites NEGATIVE  NEG      Leukocyte Esterase SMALL (A) NEG     URINE MICROSCOPIC ONLY    Collection Time: 08/24/18 11:20 AM   Result Value Ref Range    WBC 0 to 3 0 - 4 /hpf    RBC NONE 0 - 5 /hpf    Epithelial cells 4+ 0 - 5 /lpf    Mucus FEW (A) NEG /lpf       Radiologic Studies -   XR CHEST PORT   Final Result      IMPRESSION  Impression:    No radiographic evidence of acute cardiopulmonary process. Medical Decision Making   I am the first provider for this patient. I reviewed the vital signs, available nursing notes, past medical history, past surgical history, family history and social history. Vital Signs-Reviewed the patient's vital signs. Pulse Oximetry Analysis - 100% RA non hypoxic    EKG: Interpreted by the EP. Time Interpreted: 0933   Rate: 77   Rhythm: Normal Sinus Rhythm    Interpretation: normal axis. No ST elevation or depressions. Records Reviewed: Nursing Notes and Old Medical Records (Time of Review: 9:31 AM)    Provider Notes (Medical Decision Making):  MDM  Number of Diagnoses or Management Options  Atypical chest pain:   Dyspepsia:   Hematemesis with nausea:   Non-intractable vomiting with nausea, unspecified vomiting type:   Diagnosis management comments: Check labs, EKG, and CXR. Give IVF, Reglan, and tylenol. Will check urine. Medications   sodium chloride 0.9 % bolus infusion 1,000 mL (0 mL IntraVENous IV Completed 8/24/18 1307)   metoclopramide HCl (REGLAN) injection 5 mg (5 mg IntraVENous Given 8/24/18 1120)   acetaminophen (TYLENOL) tablet 975 mg (975 mg Oral Given 8/24/18 1119)     ED Course: Progress Notes, Reevaluation, and Consults:  After IVF and medicine the pt was resting sleeping. I woke her up easily arousable feeling better. Will discharged home with script for Pepcid and recommends she follow up with OB.  Sx likely from dyspepsia, vomiting episode. I doubt mallorywise tear or boerhaave or thrombosis. I have reassessed the patient. I have discussed the workup, results and plan with the patient and patient is in agreement. Patient is feeling better. Patient will be prescribed pepcid. Patient was discharge in stable condition. Patient was given outpatient follow up. Patient is to return to emergency department if any new or worsening condition. Diagnosis     Clinical Impression:   1. Non-intractable vomiting with nausea, unspecified vomiting type    2. Hematemesis with nausea    3. Atypical chest pain    4. Dyspepsia        Disposition: home     Follow-up Information     Follow up With Details Comments Elsy Sheldon MD Schedule an appointment as soon as possible for a visit in 3 days  [address]  [address]  [address] [phone] [address] [facility] EMERGENCY DEPT  As needed, If symptoms worsen [address] [facility]  [phone]           Discharge Medication List as of 8/24/2018  1:02 PM      START taking these medications    Details   famotidine (PEPCID AC) 10 mg tablet Take 1 Tab by mouth two (2) times a day for 5 days. Indications: Dyspepsia, Print, Disp-10 Tab, R-0         CONTINUE these medications which have NOT CHANGED    Details   prenatal vit-iron fumarate-fa 27 mg iron- 0.8 mg tab tablet Take 1 Tab by mouth daily. , Historical Med           _______________________________    Attestations:  Aniceto Craig acting as a scribe for and in the presence of Radha Wallace, DO      August 24, 2018 at 9:31 AM       Provider Attestation:      I personally performed the services described in the documentation, reviewed the documentation, as recorded by the scribe in my presence, and it accurately and completely records my words and actions.  August 24, 2018 at 9:31 AM - Radha Aas, DO    _______________________________

## 2019-04-04 ENCOUNTER — HOSPITAL ENCOUNTER (EMERGENCY)
Age: 19
Discharge: HOME | End: 2019-04-04
Attending: EMERGENCY MEDICINE
Payer: SELF-PAY

## 2019-04-04 ENCOUNTER — APPOINTMENT (OUTPATIENT)
Dept: CT IMAGING | Age: 19
End: 2019-04-04
Attending: PHYSICIAN ASSISTANT
Payer: SELF-PAY

## 2019-04-04 VITALS
DIASTOLIC BLOOD PRESSURE: 72 MMHG | OXYGEN SATURATION: 100 % | WEIGHT: 243 LBS | HEIGHT: 64 IN | SYSTOLIC BLOOD PRESSURE: 113 MMHG | HEART RATE: 109 BPM | RESPIRATION RATE: 20 BRPM | TEMPERATURE: 99.1 F | BODY MASS INDEX: 41.48 KG/M2

## 2019-04-04 DIAGNOSIS — R10.84 ABDOMINAL PAIN, GENERALIZED: ICD-10-CM

## 2019-04-04 DIAGNOSIS — R19.7 NAUSEA VOMITING AND DIARRHEA: Primary | ICD-10-CM

## 2019-04-04 DIAGNOSIS — R11.2 NAUSEA VOMITING AND DIARRHEA: Primary | ICD-10-CM

## 2019-04-04 LAB
ALBUMIN SERPL-MCNC: 3.2 G/DL (ref 3.4–5)
ALBUMIN/GLOB SERPL: 0.6 {RATIO} (ref 0.8–1.7)
ALP SERPL-CCNC: 110 U/L (ref 45–117)
ALT SERPL-CCNC: 20 U/L (ref 13–56)
ANION GAP SERPL CALC-SCNC: 7 MMOL/L (ref 3–18)
APPEARANCE UR: CLEAR
AST SERPL-CCNC: 17 U/L (ref 15–37)
BASOPHILS # BLD: 0 K/UL (ref 0–0.1)
BASOPHILS NFR BLD: 0 % (ref 0–2)
BILIRUB SERPL-MCNC: 0.2 MG/DL (ref 0.2–1)
BILIRUB UR QL: NEGATIVE
BUN SERPL-MCNC: 10 MG/DL (ref 7–18)
BUN/CREAT SERPL: 13 (ref 12–20)
CALCIUM SERPL-MCNC: 8.5 MG/DL (ref 8.5–10.1)
CHLORIDE SERPL-SCNC: 108 MMOL/L (ref 100–108)
CO2 SERPL-SCNC: 24 MMOL/L (ref 21–32)
COLOR UR: YELLOW
CREAT SERPL-MCNC: 0.75 MG/DL (ref 0.6–1.3)
DIFFERENTIAL METHOD BLD: ABNORMAL
EOSINOPHIL # BLD: 0.2 K/UL (ref 0–0.4)
EOSINOPHIL NFR BLD: 1 % (ref 0–5)
EPITH CASTS URNS QL MICRO: NORMAL /LPF (ref 0–5)
ERYTHROCYTE [DISTWIDTH] IN BLOOD BY AUTOMATED COUNT: 21 % (ref 11.6–14.5)
GLOBULIN SER CALC-MCNC: 5 G/DL (ref 2–4)
GLUCOSE SERPL-MCNC: 89 MG/DL (ref 74–99)
GLUCOSE UR STRIP.AUTO-MCNC: NEGATIVE MG/DL
HCG UR QL: NEGATIVE
HCT VFR BLD AUTO: 36 % (ref 35–45)
HGB BLD-MCNC: 11.4 G/DL (ref 12–16)
HGB UR QL STRIP: NEGATIVE
KETONES UR QL STRIP.AUTO: NEGATIVE MG/DL
LACTATE BLD-SCNC: 0.86 MMOL/L (ref 0.4–2)
LEUKOCYTE ESTERASE UR QL STRIP.AUTO: ABNORMAL
LIPASE SERPL-CCNC: 127 U/L (ref 73–393)
LYMPHOCYTES # BLD: 1.5 K/UL (ref 0.9–3.6)
LYMPHOCYTES NFR BLD: 9 % (ref 21–52)
MCH RBC QN AUTO: 17 PG (ref 24–34)
MCHC RBC AUTO-ENTMCNC: 31.7 G/DL (ref 31–37)
MCV RBC AUTO: 53.7 FL (ref 74–97)
MONOCYTES # BLD: 0.7 K/UL (ref 0.05–1.2)
MONOCYTES NFR BLD: 4 % (ref 3–10)
NEUTS SEG # BLD: 14.6 K/UL (ref 1.8–8)
NEUTS SEG NFR BLD: 86 % (ref 40–73)
NITRITE UR QL STRIP.AUTO: NEGATIVE
PH UR STRIP: 5.5 [PH] (ref 5–8)
PLATELET # BLD AUTO: 407 K/UL (ref 135–420)
PLATELET COMMENTS,PCOM: ABNORMAL
POTASSIUM SERPL-SCNC: 4.1 MMOL/L (ref 3.5–5.5)
PROT SERPL-MCNC: 8.2 G/DL (ref 6.4–8.2)
PROT UR STRIP-MCNC: NEGATIVE MG/DL
RBC # BLD AUTO: 6.7 M/UL (ref 4.2–5.3)
RBC #/AREA URNS HPF: NORMAL /HPF (ref 0–5)
RBC MORPH BLD: ABNORMAL
SODIUM SERPL-SCNC: 139 MMOL/L (ref 136–145)
SP GR UR REFRACTOMETRY: 1.02 (ref 1–1.03)
UROBILINOGEN UR QL STRIP.AUTO: 0.2 EU/DL (ref 0.2–1)
WBC # BLD AUTO: 17 K/UL (ref 4.6–13.2)
WBC URNS QL MICRO: NORMAL /HPF (ref 0–4)

## 2019-04-04 PROCEDURE — 85025 COMPLETE CBC W/AUTO DIFF WBC: CPT

## 2019-04-04 PROCEDURE — 99283 EMERGENCY DEPT VISIT LOW MDM: CPT

## 2019-04-04 PROCEDURE — 80053 COMPREHEN METABOLIC PANEL: CPT

## 2019-04-04 PROCEDURE — 81025 URINE PREGNANCY TEST: CPT

## 2019-04-04 PROCEDURE — 87040 BLOOD CULTURE FOR BACTERIA: CPT

## 2019-04-04 PROCEDURE — 74177 CT ABD & PELVIS W/CONTRAST: CPT

## 2019-04-04 PROCEDURE — 83605 ASSAY OF LACTIC ACID: CPT

## 2019-04-04 PROCEDURE — 74011636320 HC RX REV CODE- 636/320: Performed by: EMERGENCY MEDICINE

## 2019-04-04 PROCEDURE — 81001 URINALYSIS AUTO W/SCOPE: CPT

## 2019-04-04 PROCEDURE — 83690 ASSAY OF LIPASE: CPT

## 2019-04-04 RX ORDER — PROMETHAZINE HYDROCHLORIDE 25 MG/1
25 TABLET ORAL
Qty: 12 TAB | Refills: 0 | Status: SHIPPED | OUTPATIENT
Start: 2019-04-04

## 2019-04-04 RX ADMIN — IOPAMIDOL 100 ML: 612 INJECTION, SOLUTION INTRAVENOUS at 16:21

## 2019-04-04 NOTE — ED PROVIDER NOTES
EMERGENCY DEPARTMENT HISTORY AND PHYSICAL EXAM 
 
Date: 2019 Patient Name: Rosanne Toledo History of Presenting Illness Chief Complaint Patient presents with  Abdominal Pain  Diarrhea  Vomiting History Provided By: Patient Chief Complaint: abdominal pain Duration: this morning Timing:  Acute and constant Location: generalized Quality: Aching and Cramping Severity: Moderate Modifying Factors: none Associated Symptoms: N/V/D Additional History (Context): Rosanne Toledo is a 22 y.o. female with No significant past medical history who presents with generalized abdominal pain nausea vomiting and diarrhea onset this morning. Patient states she believes her symptoms are due to eating [employer] fries and chicken nuggets last night. Patient denies fever and chills. States she is currently not hungry. Denies any urinary complaints or vaginal discharge. She states she is 3 months postpartum. PCP: Sarita Lebron MD 
 
 
 
Past History Past Medical History: 
History reviewed. No pertinent past medical history. Past Surgical History: 
Past Surgical History:  
Procedure Laterality Date  HX  SECTION Family History: 
History reviewed. No pertinent family history. Social History: 
Social History Tobacco Use  Smoking status: Never Smoker  Smokeless tobacco: Never Used Substance Use Topics  Alcohol use: No  
 Drug use: Not on file Allergies: 
No Known Allergies Review of Systems Review of Systems Constitutional: Negative for chills and fever. Respiratory: Negative. Cardiovascular: Negative. Gastrointestinal: Positive for abdominal pain, diarrhea, nausea and vomiting. Negative for anal bleeding and blood in stool. Genitourinary: Negative. Neurological: Negative. All other systems reviewed and are negative. All Other Systems Negative Physical Exam  
 
Vitals:  
 19 1322 BP: 113/72 Pulse: (!) 109 Resp: 20 Temp: 99.1 °F (37.3 °C) SpO2: 100% Weight: 110.2 kg (243 lb) Height: 5' 4\" (1.626 m) Physical Exam  
Constitutional: She is oriented to person, place, and time. She appears well-developed and well-nourished. No distress. obese HENT:  
Head: Normocephalic and atraumatic. Right Ear: Hearing, tympanic membrane, external ear and ear canal normal.  
Left Ear: Hearing, tympanic membrane, external ear and ear canal normal.  
Nose: Nose normal.  
Mouth/Throat: Uvula is midline. Mucous membranes are dry. No oropharyngeal exudate. Dry mucous membranes Eyes: Conjunctivae are normal. Right eye exhibits no discharge. Left eye exhibits no discharge. Neck: Normal range of motion. Neck supple. Cardiovascular: Normal rate and regular rhythm. Pulmonary/Chest: Effort normal and breath sounds normal.  
Abdominal: Soft. Bowel sounds are normal. There is tenderness (generalized ). Musculoskeletal: Normal range of motion. Lymphadenopathy:  
  She has no cervical adenopathy. Neurological: She is alert and oriented to person, place, and time. Skin: Skin is warm and dry. She is not diaphoretic. Psychiatric: She has a normal mood and affect. Nursing note and vitals reviewed. Diagnostic Study Results Labs - Recent Results (from the past 12 hour(s)) CBC WITH AUTOMATED DIFF Collection Time: 04/04/19  1:28 PM  
Result Value Ref Range WBC 17.0 (H) 4.6 - 13.2 K/uL  
 RBC 6.70 (H) 4.20 - 5.30 M/uL  
 HGB 11.4 (L) 12.0 - 16.0 g/dL HCT 36.0 35.0 - 45.0 % MCV 53.7 (L) 74.0 - 97.0 FL  
 MCH 17.0 (L) 24.0 - 34.0 PG  
 MCHC 31.7 31.0 - 37.0 g/dL RDW 21.0 (H) 11.6 - 14.5 % PLATELET 433 084 - 756 K/uL NEUTROPHILS 86 (H) 40 - 73 % LYMPHOCYTES 9 (L) 21 - 52 % MONOCYTES 4 3 - 10 % EOSINOPHILS 1 0 - 5 % BASOPHILS 0 0 - 2 %  
 ABS. NEUTROPHILS 14.6 (H) 1.8 - 8.0 K/UL  
 ABS. LYMPHOCYTES 1.5 0.9 - 3.6 K/UL ABS. MONOCYTES 0.7 0.05 - 1.2 K/UL  
 ABS. EOSINOPHILS 0.2 0.0 - 0.4 K/UL  
 ABS. BASOPHILS 0.0 0.0 - 0.1 K/UL  
 DF AUTOMATED PLATELET COMMENTS ADEQUATE PLATELETS    
 RBC COMMENTS ANISOCYTOSIS 4+ RBC COMMENTS HYPOCHROMIA 1+ 
    
 RBC COMMENTS TARGET CELLS 
1+ METABOLIC PANEL, COMPREHENSIVE Collection Time: 04/04/19  1:28 PM  
Result Value Ref Range Sodium 139 136 - 145 mmol/L Potassium 4.1 3.5 - 5.5 mmol/L Chloride 108 100 - 108 mmol/L  
 CO2 24 21 - 32 mmol/L Anion gap 7 3.0 - 18 mmol/L Glucose 89 74 - 99 mg/dL BUN 10 7.0 - 18 MG/DL Creatinine 0.75 0.6 - 1.3 MG/DL  
 BUN/Creatinine ratio 13 12 - 20 GFR est AA >60 >60 ml/min/1.73m2 GFR est non-AA >60 >60 ml/min/1.73m2 Calcium 8.5 8.5 - 10.1 MG/DL Bilirubin, total 0.2 0.2 - 1.0 MG/DL  
 ALT (SGPT) 20 13 - 56 U/L  
 AST (SGOT) 17 15 - 37 U/L Alk. phosphatase 110 45 - 117 U/L Protein, total 8.2 6.4 - 8.2 g/dL Albumin 3.2 (L) 3.4 - 5.0 g/dL Globulin 5.0 (H) 2.0 - 4.0 g/dL A-G Ratio 0.6 (L) 0.8 - 1.7 LIPASE Collection Time: 04/04/19  1:28 PM  
Result Value Ref Range Lipase 127 73 - 393 U/L  
URINALYSIS W/ RFLX MICROSCOPIC Collection Time: 04/04/19  1:54 PM  
Result Value Ref Range Color YELLOW Appearance CLEAR Specific gravity 1.018 1.005 - 1.030    
 pH (UA) 5.5 5.0 - 8.0 Protein NEGATIVE  NEG mg/dL Glucose NEGATIVE  NEG mg/dL Ketone NEGATIVE  NEG mg/dL Bilirubin NEGATIVE  NEG Blood NEGATIVE  NEG Urobilinogen 0.2 0.2 - 1.0 EU/dL Nitrites NEGATIVE  NEG Leukocyte Esterase SMALL (A) NEG    
HCG URINE, QL Collection Time: 04/04/19  1:54 PM  
Result Value Ref Range HCG urine, QL NEGATIVE  NEG    
POC LACTIC ACID Collection Time: 04/04/19  1:54 PM  
Result Value Ref Range Lactic Acid (POC) 0.86 0.40 - 2.00 mmol/L  
URINE MICROSCOPIC ONLY Collection Time: 04/04/19  1:54 PM  
Result Value Ref Range  WBC 4 to 10 0 - 4 /hpf  
 RBC 0 to 3 0 - 5 /hpf Epithelial cells 1+ 0 - 5 /lpf Radiologic Studies -  
CT ABD PELV W CONT Final Result Impression:  
  
Findings suggestive of diffuse gastroenteritis involving the stomach, small  
bowel, and colon with prominent fluid throughout. No associated wall thickening  
or bowel obstruction. No free air or abscess. Probable corpus luteum in the right ovary. CT Results  (Last 48 hours) 04/04/19 1621  CT ABD PELV W CONT Final result Impression:  Impression:  
   
Findings suggestive of diffuse gastroenteritis involving the stomach, small  
bowel, and colon with prominent fluid throughout. No associated wall thickening  
or bowel obstruction. No free air or abscess. Probable corpus luteum in the right ovary. Narrative:  CT Abdomen And Pelvis with Intravenous Contrast  
   
INDICATION: Generalized abdominal pain, nausea, and vomiting. Melonie Bull TECHNIQUE: 5 mm collimation axial images obtained from the diaphragm to the  
level of the pubic symphysis following the uneventful administration of  100 cc  
of low osmolar, nonionic intravenous contrast.  
   
Dose reduction techniques used: Automated exposure control, adjustment of the  
mAs and/or kVp according to patient size, standardized low-dose protocol, and/or  
iterative reconstruction technique. COMPARISON: None. ABDOMEN FINDINGS:  
   
Lung Bases: Clear. The visualized portions of the mediastinum are normal.. Liver: Normal attenuation. No evidence for mass. Gallbladder: Present and appears normal. No biliary ductal dilatation. Pancreas: Normal attenuation without mass or ductal dilatation. Spleen: Normal in size. No evidence of mass. Melonie  Adrenal Glands: No evidence for mass. Kidneys:   
   
Right: Normal enhancement. No cortical mass. No hydronephrosis. Left:  Normal enhancement. No cortical mass. No hydronephrosis. Lymph Nodes: No lymphadenopathy. Aorta:  Normal in caliber. PELVIS FINDINGS:   
   
Bowel: Small Bowel: Prominent fluid in the stomach and in multiple small bowel  
loops without dilatation. Lerry Eliza Large Bowel: Prominent fluid throughout the colon without wall thickening. This  
suggests significant diarrhea. Lerry Eliza Appendix: Not confidently seen, but no secondary signs of acute appendicitis. Bladder: Normal.  
   
Uterus is unremarkable. Probable corpus luteum in the right adnexa measuring 2.1 cm. No ascites. Tiny fat-containing umbilical hernia. Bones: Degenerative changes of the spine. Lerry Eliza CXR Results  (Last 48 hours) None Medical Decision Making I am the first provider for this patient. I reviewed the vital signs, available nursing notes, past medical history, past surgical history, family history and social history. Vital Signs-Reviewed the patient's vital signs. Pulse Oximetry Analysis - 100% on RA Records Reviewed: Nursing Notes Procedures: 
Procedures Provider Notes (Medical Decision Making): 66-year-old female presents with generalized abdominal pain, nausea, vomiting diarrhea onset this morning which she suspects started after eating fast food fries and chicken nuggets last night. All discharge criteria was triggered at triage. Her abdomen is soft and minimally tender with no peritoneal signs. Her lactic is negative however she does have leukocytosis. Will check CT of abdomen and pelvis to rule out any intra-abdominal process. 4:30 PM Results discussed w/ pt. Pt appears to be feeling better. She is sitting up in bed, talking on the phone, in no distress. Advised pt to start bland diet and advance. Rx for phenergan. Pt agrees w/ plan. MED RECONCILIATION: 
No current facility-administered medications for this encounter. Current Outpatient Medications Medication Sig  
  promethazine (PHENERGAN) 25 mg tablet Take 1 Tab by mouth every six (6) hours as needed for Nausea. Disposition: 
home DISCHARGE NOTE:  
 
Pt has been reexamined. Patient has no new complaints, changes, or physical findings. Care plan outlined and precautions discussed. Results of labs and CT were reviewed with the patient. All medications were reviewed with the patient; will d/c home with phenergan. All of pt's questions and concerns were addressed. Patient was instructed and agrees to follow up with pcp, as well as to return to the ED upon further deterioration. Patient is ready to go home. Follow-up Information Follow up With Specialties Details Why Contact Info Robyn Pena MD Obstetrics & Gynecology, Gynecology, [facility] [address] [facility] [address] 
[phone] [address] [facility] EMERGENCY DEPT Emergency Medicine  If symptoms worsen [address] [id number] 
[phone] Discharge Medication List as of 4/4/2019  4:31 PM  
  
START taking these medications Details  
promethazine (PHENERGAN) 25 mg tablet Take 1 Tab by mouth every six (6) hours as needed for Nausea. , Print, Disp-12 Tab, R-0 Diagnosis Clinical Impression: 1. Nausea vomiting and diarrhea 2. Abdominal pain, generalized

## 2019-04-04 NOTE — ED TRIAGE NOTES
Patient states onset of abdominal, vomiting and diarrhea this morning. She attributes symptoms to eating bad food.

## 2019-04-04 NOTE — DISCHARGE INSTRUCTIONS

## 2019-04-10 LAB
BACTERIA SPEC CULT: NORMAL
BACTERIA SPEC CULT: NORMAL
SERVICE CMNT-IMP: NORMAL
SERVICE CMNT-IMP: NORMAL

## 2019-05-18 ENCOUNTER — HOSPITAL ENCOUNTER (EMERGENCY)
Age: 19
Discharge: HOME | End: 2019-05-19
Attending: EMERGENCY MEDICINE
Payer: MEDICAID

## 2019-05-18 VITALS — OXYGEN SATURATION: 100 % | TEMPERATURE: 98.6 F | HEART RATE: 104 BPM | RESPIRATION RATE: 18 BRPM
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
## 2019-05-18 PROCEDURE — 99281 EMR DPT VST MAYX REQ PHY/QHP: CPT

## 2019-05-19 RX ORDER — IBUPROFEN 800 MG/1
800 TABLET ORAL
Qty: 20 TAB | Refills: 0 | Status: SHIPPED | OUTPATIENT
Start: 2019-05-19 | End: 2019-05-26

## 2019-05-19 RX ORDER — METHOCARBAMOL 500 MG/1
500 TABLET, FILM COATED ORAL 4 TIMES DAILY
Qty: 20 TAB | Refills: 0 | Status: SHIPPED | OUTPATIENT
Start: 2019-05-19

## 2019-05-19 NOTE — DISCHARGE INSTRUCTIONS
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
## 2024-06-06 ENCOUNTER — HOSPITAL ENCOUNTER (EMERGENCY)
Facility: HOSPITAL | Age: 24
Discharge: HOME | End: 2024-06-06
Payer: MEDICAID

## 2024-06-06 VITALS
DIASTOLIC BLOOD PRESSURE: 85 MMHG | SYSTOLIC BLOOD PRESSURE: 125 MMHG | HEART RATE: 98 BPM | RESPIRATION RATE: 19 BRPM | OXYGEN SATURATION: 99 % | HEIGHT: 64 IN | TEMPERATURE: 99.2 F | WEIGHT: 293 LBS | BODY MASS INDEX: 50.02 KG/M2

## 2024-06-06 DIAGNOSIS — E87.6 HYPOKALEMIA: ICD-10-CM

## 2024-06-06 DIAGNOSIS — R55 VASOVAGAL EPISODE: Primary | ICD-10-CM

## 2024-06-06 DIAGNOSIS — T67.5XXA HEAT EXHAUSTION, INITIAL ENCOUNTER: ICD-10-CM

## 2024-06-06 LAB
ALBUMIN SERPL-MCNC: 2.8 G/DL (ref 3.4–5)
ALBUMIN/GLOB SERPL: 0.6 (ref 0.8–1.7)
ALP SERPL-CCNC: 89 U/L (ref 45–117)
ALT SERPL-CCNC: 20 U/L (ref 13–56)
ANION GAP SERPL CALC-SCNC: 7 MMOL/L (ref 3–18)
AST SERPL-CCNC: 18 U/L (ref 10–38)
BASOPHILS # BLD: 0 K/UL (ref 0–0.1)
BASOPHILS NFR BLD: 0 % (ref 0–2)
BILIRUB SERPL-MCNC: 0.1 MG/DL (ref 0.2–1)
BUN SERPL-MCNC: 6 MG/DL (ref 7–18)
BUN/CREAT SERPL: 8 (ref 12–20)
CALCIUM SERPL-MCNC: 8.1 MG/DL (ref 8.5–10.1)
CHLORIDE SERPL-SCNC: 108 MMOL/L (ref 100–111)
CK SERPL-CCNC: 316 U/L (ref 26–192)
CO2 SERPL-SCNC: 25 MMOL/L (ref 21–32)
CREAT SERPL-MCNC: 0.77 MG/DL (ref 0.6–1.3)
DIFFERENTIAL METHOD BLD: ABNORMAL
EOSINOPHIL # BLD: 0.2 K/UL (ref 0–0.4)
EOSINOPHIL NFR BLD: 2 % (ref 0–5)
ERYTHROCYTE [DISTWIDTH] IN BLOOD BY AUTOMATED COUNT: 20.3 % (ref 11.6–14.5)
GLOBULIN SER CALC-MCNC: 4.8 G/DL (ref 2–4)
GLUCOSE BLD STRIP.AUTO-MCNC: 94 MG/DL (ref 70–110)
GLUCOSE SERPL-MCNC: 94 MG/DL (ref 74–99)
HCG SERPL QL: NEGATIVE
HCT VFR BLD AUTO: 30.8 % (ref 35–45)
HGB BLD-MCNC: 9.3 G/DL (ref 12–16)
IMM GRANULOCYTES # BLD AUTO: 0 K/UL (ref 0–0.04)
IMM GRANULOCYTES NFR BLD AUTO: 0 % (ref 0–0.5)
LYMPHOCYTES # BLD: 2.4 K/UL (ref 0.9–3.6)
LYMPHOCYTES NFR BLD: 19 % (ref 21–52)
MAGNESIUM SERPL-MCNC: 1.9 MG/DL (ref 1.6–2.6)
MCH RBC QN AUTO: 17.2 PG (ref 24–34)
MCHC RBC AUTO-ENTMCNC: 30.2 G/DL (ref 31–37)
MCV RBC AUTO: 56.9 FL (ref 78–100)
MONOCYTES # BLD: 0.6 K/UL (ref 0.05–1.2)
MONOCYTES NFR BLD: 5 % (ref 3–10)
NEUTS SEG # BLD: 9.4 K/UL (ref 1.8–8)
NEUTS SEG NFR BLD: 75 % (ref 40–73)
NRBC # BLD: 0 K/UL (ref 0–0.01)
NRBC BLD-RTO: 0 PER 100 WBC
PLATELET # BLD AUTO: 333 K/UL (ref 135–420)
POTASSIUM SERPL-SCNC: 3.3 MMOL/L (ref 3.5–5.5)
PROT SERPL-MCNC: 7.6 G/DL (ref 6.4–8.2)
RBC # BLD AUTO: 5.41 M/UL (ref 4.2–5.3)
SODIUM SERPL-SCNC: 140 MMOL/L (ref 136–145)
TROPONIN I SERPL HS-MCNC: <3 NG/L (ref 0–54)
WBC # BLD AUTO: 12.6 K/UL (ref 4.6–13.2)

## 2024-06-06 PROCEDURE — 82550 ASSAY OF CK (CPK): CPT

## 2024-06-06 PROCEDURE — 80053 COMPREHEN METABOLIC PANEL: CPT

## 2024-06-06 PROCEDURE — 84703 CHORIONIC GONADOTROPIN ASSAY: CPT

## 2024-06-06 PROCEDURE — 82962 GLUCOSE BLOOD TEST: CPT

## 2024-06-06 PROCEDURE — 2580000003 HC RX 258: Performed by: EMERGENCY MEDICINE

## 2024-06-06 PROCEDURE — 93005 ELECTROCARDIOGRAM TRACING: CPT | Performed by: EMERGENCY MEDICINE

## 2024-06-06 PROCEDURE — 83735 ASSAY OF MAGNESIUM: CPT

## 2024-06-06 PROCEDURE — 85025 COMPLETE CBC W/AUTO DIFF WBC: CPT

## 2024-06-06 PROCEDURE — 96360 HYDRATION IV INFUSION INIT: CPT

## 2024-06-06 PROCEDURE — 84484 ASSAY OF TROPONIN QUANT: CPT

## 2024-06-06 PROCEDURE — 99284 EMERGENCY DEPT VISIT MOD MDM: CPT

## 2024-06-06 PROCEDURE — 6370000000 HC RX 637 (ALT 250 FOR IP): Performed by: EMERGENCY MEDICINE

## 2024-06-06 RX ORDER — 0.9 % SODIUM CHLORIDE 0.9 %
1000 INTRAVENOUS SOLUTION INTRAVENOUS ONCE
Status: COMPLETED | OUTPATIENT
Start: 2024-06-06 | End: 2024-06-06

## 2024-06-06 RX ADMIN — SODIUM CHLORIDE 1000 ML: 9 INJECTION, SOLUTION INTRAVENOUS at 18:06

## 2024-06-06 RX ADMIN — POTASSIUM BICARBONATE 40 MEQ: 782 TABLET, EFFERVESCENT ORAL at 18:48

## 2024-06-06 ASSESSMENT — ENCOUNTER SYMPTOMS
NAUSEA: 0
VOMITING: 0
GASTROINTESTINAL NEGATIVE: 1
ALLERGIC/IMMUNOLOGIC NEGATIVE: 1
SHORTNESS OF BREATH: 0

## 2024-06-06 ASSESSMENT — LIFESTYLE VARIABLES
HOW MANY STANDARD DRINKS CONTAINING ALCOHOL DO YOU HAVE ON A TYPICAL DAY: 1 OR 2
HOW OFTEN DO YOU HAVE A DRINK CONTAINING ALCOHOL: MONTHLY OR LESS

## 2024-06-06 ASSESSMENT — PAIN SCALES - GENERAL: PAINLEVEL_OUTOF10: 0

## 2024-06-06 ASSESSMENT — PAIN - FUNCTIONAL ASSESSMENT: PAIN_FUNCTIONAL_ASSESSMENT: 0-10

## 2024-06-06 NOTE — ED PROVIDER NOTES
[facility] EMERGENCY DEPT  EMERGENCY DEPARTMENT ENCOUNTER      Pt Name: Rodolfo Wei  MRN: [id number]  Birthdate 2000  Date of evaluation: 6/6/2024  Provider: FERNANDO Coto  6:45 PM    CHIEF COMPLAINT       Chief Complaint   Patient presents with    Dizziness    Loss of Consciousness         HISTORY OF PRESENT ILLNESS    Rodolfo Wei is a 24 y.o. female who presents to the emergency department with feeling overheated today.  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  Her supervisor was made aware and asked her to go over the  care and as she walked over she sat down her head went back and she said she was still sitting in the chair when she woke up but her supervisor said that she was unresponsive for a few seconds.  Patient feels a little lightheaded.    HPI    Nursing Notes were reviewed.    REVIEW OF SYSTEMS       Review of Systems   Constitutional:  Negative for fever.   HENT: Negative.     Eyes:  Negative for visual disturbance.   Respiratory:  Negative for shortness of breath.    Cardiovascular:  Negative for chest pain.   Gastrointestinal: Negative.  Negative for nausea and vomiting.   Endocrine: Negative.    Genitourinary: Negative.    Musculoskeletal: Negative.    Skin: Negative.    Allergic/Immunologic: Negative.    Neurological:  Positive for syncope and light-headedness.   Hematological: Negative.    Psychiatric/Behavioral: Negative.         Except as noted above the remainder of the review of systems was reviewed and negative.       PAST MEDICAL HISTORY   History reviewed. No pertinent past medical history.      SURGICAL HISTORY     History reviewed. No pertinent surgical history.      CURRENT MEDICATIONS       Previous Medications    No medications on file       ALLERGIES

## 2024-06-06 NOTE — FLOWSHEET NOTE
06/06/24 1815   Vital Signs   Orthostatic B/P and Pulse? Yes   Blood Pressure Lying 130/82   Pulse Lying 85 PER MINUTE   Blood Pressure Sitting 127/87   Pulse Sitting 83 PER MINUTE   Blood Pressure Standing 132/88   Pulse Standing 93 PER MINUTE

## 2024-06-06 NOTE — ED TRIAGE NOTES
Patient presents to ER via stretcher with [place] [id number] for syncopal episode while at work. 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  So she sat down. After being seated she stood up and became very dizzy. Next thing she remembers is awakening with a lot of ppl including paramedics around her.

## 2024-06-07 LAB
EKG ATRIAL RATE: 85 BPM
EKG DIAGNOSIS: NORMAL
EKG P AXIS: 2 DEGREES
EKG P-R INTERVAL: 138 MS
EKG Q-T INTERVAL: 380 MS
EKG QRS DURATION: 82 MS
EKG QTC CALCULATION (BAZETT): 452 MS
EKG R AXIS: 28 DEGREES
EKG T AXIS: 30 DEGREES
EKG VENTRICULAR RATE: 85 BPM

## 2024-06-07 PROCEDURE — 93010 ELECTROCARDIOGRAM REPORT: CPT | Performed by: INTERNAL MEDICINE
